# Patient Record
Sex: FEMALE | Employment: OTHER | URBAN - METROPOLITAN AREA
[De-identification: names, ages, dates, MRNs, and addresses within clinical notes are randomized per-mention and may not be internally consistent; named-entity substitution may affect disease eponyms.]

---

## 2017-02-24 ENCOUNTER — RADIANT APPOINTMENT (OUTPATIENT)
Dept: ULTRASOUND IMAGING | Facility: CLINIC | Age: 22
End: 2017-02-24
Attending: OBSTETRICS & GYNECOLOGY
Payer: COMMERCIAL

## 2017-02-24 ENCOUNTER — OFFICE VISIT (OUTPATIENT)
Dept: OBGYN | Facility: CLINIC | Age: 22
End: 2017-02-24
Payer: COMMERCIAL

## 2017-02-24 VITALS
BODY MASS INDEX: 22.5 KG/M2 | HEIGHT: 56 IN | DIASTOLIC BLOOD PRESSURE: 58 MMHG | SYSTOLIC BLOOD PRESSURE: 102 MMHG | WEIGHT: 100 LBS

## 2017-02-24 DIAGNOSIS — Z30.431 IUD CHECK UP: ICD-10-CM

## 2017-02-24 DIAGNOSIS — Z12.4 SCREENING FOR CERVICAL CANCER: ICD-10-CM

## 2017-02-24 DIAGNOSIS — M25.559 PAIN IN JOINT, PELVIC REGION AND THIGH, UNSPECIFIED LATERALITY: Primary | ICD-10-CM

## 2017-02-24 DIAGNOSIS — Z30.011 OCP (ORAL CONTRACEPTIVE PILLS) INITIATION: ICD-10-CM

## 2017-02-24 DIAGNOSIS — Z30.432 ENCOUNTER FOR IUD REMOVAL: ICD-10-CM

## 2017-02-24 DIAGNOSIS — M25.559 PAIN IN JOINT, PELVIC REGION AND THIGH, UNSPECIFIED LATERALITY: ICD-10-CM

## 2017-02-24 DIAGNOSIS — R87.610 ASCUS OF CERVIX WITH NEGATIVE HIGH RISK HPV: ICD-10-CM

## 2017-02-24 PROCEDURE — 88175 CYTOPATH C/V AUTO FLUID REDO: CPT | Performed by: OBSTETRICS & GYNECOLOGY

## 2017-02-24 PROCEDURE — 58301 REMOVE INTRAUTERINE DEVICE: CPT | Performed by: OBSTETRICS & GYNECOLOGY

## 2017-02-24 PROCEDURE — 76830 TRANSVAGINAL US NON-OB: CPT | Performed by: OBSTETRICS & GYNECOLOGY

## 2017-02-24 PROCEDURE — 99213 OFFICE O/P EST LOW 20 MIN: CPT | Mod: 25 | Performed by: OBSTETRICS & GYNECOLOGY

## 2017-02-24 RX ORDER — NORETHINDRONE ACETATE AND ETHINYL ESTRADIOL 1MG-20(21)
1 KIT ORAL DAILY
Qty: 84 TABLET | Refills: 3 | Status: SHIPPED | OUTPATIENT
Start: 2017-02-24 | End: 2017-10-13

## 2017-02-24 NOTE — MR AVS SNAPSHOT
"              After Visit Summary   2/24/2017    Mile De León    MRN: [id number]           Patient Information     Date Of Birth          1995        Visit Information        Provider Department      2/24/2017 1:40 PM Malena Arnold MD [facility]        Today's Diagnoses     Pain in joint, pelvic region and thigh, unspecified laterality    -  1    IUD check up        OCP (oral contraceptive pills) initiation        ASCUS of cervix with negative high risk HPV        Screening for cervical cancer        Encounter for IUD removal           Follow-ups after your visit        Who to contact     If you have questions or need follow up information about today's clinic visit or your schedule please contact [facility] directly at [phone].  Normal or non-critical lab and imaging results will be communicated to you by Perminovahart, letter or phone within 4 business days after the clinic has received the results. If you do not hear from us within 7 days, please contact the clinic through Perminovahart or phone. If you have a critical or abnormal lab result, we will notify you by phone as soon as possible.  Submit refill requests through [facility] or call your pharmacy and they will forward the refill request to us. Please allow 3 business days for your refill to be completed.          Additional Information About Your Visit        Perminovahart Information     [facility] lets you send messages to your doctor, view your test results, renew your prescriptions, schedule appointments and more. To sign up, go to [email or web address] . Click on \"Log in\" on the left side of the screen, which will take you to the Welcome page. Then click on \"Sign up Now\" on the right side of the page.     You will be asked to enter the access code listed below, as well as some personal information. Please follow the directions to create your username and password.     Your access code is: [email or web address]  Expires: " "2017  3:02 PM     Your access code will  in 90 days. If you need help or a new code, please call your [place] clinic or [phone].        Care EveryWhere ID     This is your Care EveryWhere ID. This could be used by other organizations to access your [place] medical records  [id number]        Your Vitals Were     Height Last Period BMI (Body Mass Index)             4' 8\" (1.422 m) 2017 22.42 kg/m2          Blood Pressure from Last 3 Encounters:   17 102/58   06/08/15 120/77   10/23/13 114/54    Weight from Last 3 Encounters:   17 100 lb (45.4 kg)   06/05/15 124 lb (56.2 kg)   05/27/15 120 lb (54.4 kg)              We Performed the Following     Pap imaged thin layer screen reflex to HPV if ASCUS - recommended age 25 - 29 years     REMOVE INTRAUTERINE DEVICE          Today's Medication Changes          These changes are accurate as of: 17  3:02 PM.  If you have any questions, ask your nurse or doctor.               Start taking these medicines.        Dose/Directions    norethindrone-ethinyl estradiol 1-20 MG-MCG per tablet   Commonly known as:  JUNEL FE 1/20   Used for:  OCP (oral contraceptive pills) initiation   Started by:  Malena Arnold MD        Dose:  1 tablet   Take 1 tablet by mouth daily   Quantity:  84 tablet   Refills:  3            Where to get your medicines      These medications were sent to [facility]/pharmacy #[id number] [address] [address]     Phone:  [phone]     norethindrone-ethinyl estradiol 1-20 MG-MCG per tablet                Primary Care Provider    Physician No Ref-Primary       No address on file        Thank you!     Thank you for choosing [facility]  for your care. Our goal is always to provide you with excellent care. Hearing back from our patients is one way we can continue to improve our services. Please take a few minutes to complete the written survey that you " may receive in the mail after your visit with us. Thank you!             Your Updated Medication List - Protect others around you: Learn how to safely use, store and throw away your medicines at www.disposemymeds.org.          This list is accurate as of: 2/24/17  3:02 PM.  Always use your most recent med list.                   Brand Name Dispense Instructions for use    norethindrone-ethinyl estradiol 1-20 MG-MCG per tablet    JUNEL FE 1/20    84 tablet    Take 1 tablet by mouth daily       prenatal multivitamin  plus iron 27-0.8 MG Tabs per tablet      Take 1 tablet by mouth daily       senna-docusate 8.6-50 MG per tablet    SENOKOT-S;PERICOLACE    40 tablet    Take 1 tablet by mouth daily

## 2017-02-24 NOTE — PROGRESS NOTES
SUBJECTIVE:                                                   Mile De León is a 21 year old female who presents to clinic today for the following health issue(s):  Patient presents with:  IUD: IUD removal      Additional information: discuss different options for birth control    HPI:  Patient hasn't been seen since her mirena was place 2015. At that time was her PP and she had an ASCUS pap but HPV negative. Hasn't followed up since..  For the most part patient likes her IUD. Gets a very light period of many 1 or 2 brown spotting days and not even every month. Has not had pain or cramping until last week when called for this appointment. Had a week where had a lot of lower abdominal pain and cramping and would happen just when laying in bed and not doing anything. Tried to be s.a couple times and hurt too much so couldn't even. . That was a week ago and now is just fine.  Only issue is feel like having a lot of hormonal ups and downs and mood swings and just not liking it. Has happened since the IUD but has had that since having Sarina so could be just related to having a child now. Was on ocps for years before that. Did have a break from everything for quite a while before Sarina and no mood swings then. Would like to try to switch to an ocp from her IUD and then will stop it in about a year and try again.    Patient's last menstrual period was 2017..   Patient is sexually active, .  Using IUD for contraception.    reports that she has quit smoking. She does not have any smokeless tobacco history on file.    STD testing offered?  Declined    Health maintenance updated:  yes    Problem list and histories reviewed & adjusted, as indicated.  Additional history: as documented.    Patient Active Problem List   Diagnosis     Indication for care in labor or delivery     Normal labor     Pregnancy     S/P  section     Past Surgical History   Procedure Laterality Date      section N/A  "2015     Procedure:  SECTION;  Surgeon: Malena Arnold MD;  Location:  L+D      Social History   Substance Use Topics     Smoking status: Former Smoker     Smokeless tobacco: Not on file     Alcohol use No           Current Outpatient Prescriptions   Medication Sig     senna-docusate (SENOKOT-S;PERICOLACE) 8.6-50 MG per tablet Take 1 tablet by mouth daily     ferrous sulfate (IRON) 325 (65 FE) MG tablet Take 1 tablet (325 mg) by mouth daily (with breakfast)     Prenatal Vit-Fe Fumarate-FA (PRENATAL MULTIVITAMIN  PLUS IRON) 27-0.8 MG TABS Take 1 tablet by mouth daily     oxyCODONE (ROXICODONE) 5 MG immediate release tablet Take 1-2 tablets (5-10 mg) by mouth every 4 hours as needed for moderate to severe pain (Patient not taking: Reported on 2017)     No current facility-administered medications for this visit.      No Known Allergies    ROS:  Constitutional: Fatigue, Loss of Appetite and Weight Loss  Head: Sore Throat  Gastrointestinal: Abdominal Pain, Bloating and Constipation  Genitourinary: Irregular Menses, Painful [place], Significant PMS and Vaginal Dryness  Endocrine: Decreased Libido and Loss of Hair  Psychiatric: Anxiety and Mittal    OBJECTIVE:     /58  Ht 4' 8\" (1.422 m)  Wt 100 lb (45.4 kg)  LMP 2017  BMI 22.42 kg/m2  Body mass index is 22.42 kg/(m^2).    Exam:  Constitutional:  Appearance: Well nourished, well developed alert, in no acute distress  Pelvic Exam:  External Genitalia:     Normal appearance for age, no discharge present, no tenderness present, no inflammatory lesions present, color normal  Vagina:     Normal vaginal vault without central or paravaginal defects, no discharge present, no inflammatory lesions present, no masses present  Bladder:     Nontender to palpation  Urethra:   Urethral Body:  Urethra palpation normal, urethra structural support normal   Urethral Meatus:  No erythema or lesions present  Cervix:     Appearance healthy, no lesions " present, nontender to palpation, no bleeding present  Uterus:     Nontender to palpation, no masses present, position anteflexed, mobility: normal  Adnexa:     No adnexal tenderness present, no adnexal masses present  Perineum:     Perineum within normal limits, no evidence of trauma, no rashes or skin lesions present  Anus:     Anus within normal limits, no hemorrhoids present  Inguinal Lymph Nodes:     No lymphadenopathy present  Pubic Hair:     Normal pubic hair distribution for age  Genitalia and Groin:     No rashes present, no lesions present, no areas of discoloration, no masses present     In-Clinic Test Results:  No results found for this or any previous visit (from the past 24 hour(s)).    ASSESSMENT/PLAN:                                                        ICD-10-CM    1. Pain in joint, pelvic region and thigh, unspecified laterality M25.559 US Transvaginal Non OB   2. IUD check up Z30.431 US Transvaginal Non OB         U/S was done d/t the pain last week. Everything normal on u/s but does sound like may have had a cyst of some sort.  Patient not sure what ocps she was on before but they were monphasic. Will try junel  and will have her start today but use condoms as a back up for one month  Pap repeated today since last one was ASCUS 18 months ago.    Malena Arnold MD  [facility] [place]  INDICATIONS:                                                      Is a pregnancy test required: No.  Was a consent obtained?  Yes    Mile De León is a 21 year old female,, Patient's last menstrual period was 2017. who presents today for IUD removal. Her current IUD was placed 7/31/15. She has had problems including mood swings and pain for one week last week. with the IUD. She requests removal of the IUD because she wants to change her method of contraception    Today's PHQ-2 Score: No flowsheet data found.    PROCEDURE:                                                      A  speculum exam was performed and the cervix was visualized. The IUD string was not visualized. Using ring forceps, the string  was grasped from just inside the canal and the IUD removed intact.    POST PROCEDURE:                                                      The patient tolerated the procedure well. Patient was discharged in stable condition.    Call if bleeding, pain or fever occur., Birth control counseling given. and Use of condoms/foam discussed.    Malena Arnold MD

## 2017-02-28 LAB
COPATH REPORT: NORMAL
PAP: NORMAL

## 2017-10-12 DIAGNOSIS — O36.80X0 ENCOUNTER TO DETERMINE FETAL VIABILITY OF PREGNANCY, SINGLE OR UNSPECIFIED FETUS: Primary | ICD-10-CM

## 2017-10-13 ENCOUNTER — PRENATAL OFFICE VISIT (OUTPATIENT)
Dept: OBGYN | Facility: CLINIC | Age: 22
End: 2017-10-13
Payer: MEDICAID

## 2017-10-13 ENCOUNTER — RADIANT APPOINTMENT (OUTPATIENT)
Dept: ULTRASOUND IMAGING | Facility: CLINIC | Age: 22
End: 2017-10-13
Payer: MEDICAID

## 2017-10-13 VITALS
SYSTOLIC BLOOD PRESSURE: 108 MMHG | DIASTOLIC BLOOD PRESSURE: 62 MMHG | BODY MASS INDEX: 23.62 KG/M2 | WEIGHT: 105 LBS | HEIGHT: 56 IN

## 2017-10-13 DIAGNOSIS — O36.80X0 ENCOUNTER TO DETERMINE FETAL VIABILITY OF PREGNANCY, SINGLE OR UNSPECIFIED FETUS: ICD-10-CM

## 2017-10-13 DIAGNOSIS — O26.841 UTERINE SIZE-DATE DISCREPANCY IN FIRST TRIMESTER: ICD-10-CM

## 2017-10-13 DIAGNOSIS — O36.80X0 PREGNANCY WITH INCONCLUSIVE FETAL VIABILITY, SINGLE OR UNSPECIFIED FETUS: Primary | ICD-10-CM

## 2017-10-13 PROCEDURE — 99213 OFFICE O/P EST LOW 20 MIN: CPT | Performed by: OBSTETRICS & GYNECOLOGY

## 2017-10-13 PROCEDURE — 76817 TRANSVAGINAL US OBSTETRIC: CPT | Performed by: OBSTETRICS & GYNECOLOGY

## 2017-10-13 RX ORDER — CHLORAL HYDRATE 500 MG
2 CAPSULE ORAL DAILY
COMMUNITY
End: 2018-08-06

## 2017-10-13 NOTE — PATIENT INSTRUCTIONS
US reviewed today and discussed dating discrepancy.  Reviewed possible incorrect dating or late ovualation vs threatened miscarriage.  Given Mile's recent longer cycle (and therefore later ovulation), I feel that this may simply be shifted dating but will return in 2 weeks for repeat viability us and new OB visit if everything looks okay.  Patient is known blood type negative so understands the importance of letting us know if she were to have any bleeding/spotting due to need for rhogam.

## 2017-10-13 NOTE — MR AVS SNAPSHOT
After Visit Summary   10/13/2017    Mile Cobos    MRN: [id number]           Patient Information     Date Of Birth          1995        Visit Information        Provider Department      10/13/2017 8:20 AM Loreta Rodriguez MD; [facility] TRI[facility]        Today's Diagnoses     Pregnancy with inconclusive fetal viability, single or unspecified fetus    -  1    Uterine size-date discrepancy in first trimester          Care Instructions    US reviewed today and discussed dating discrepancy.  Reviewed possible incorrect dating or late ovualation vs threatened miscarriage.  Given Mile's recent longer cycle (and therefore later ovulation), I feel that this may simply be shifted dating but will return in 2 weeks for repeat viability us and new OB visit if everything looks okay.  Patient is known blood type negative so understands the importance of letting us know if she were to have any bleeding/spotting due to need for rhogam.          Follow-ups after your visit        Follow-up notes from your care team     Return in about 2 weeks (around 10/27/2017) for Viability us and New OB visit.      Your next 10 appointments already scheduled     Oct 25, 2017 12:45 PM CDT   US PELVIC COMPLETE W TRANSVAGINAL with [email or web address]   [facility] [place] ([facility] [place])    [address] [place] [id number] [phone]           Please bring a list of your medicines (including vitamins, minerals and over-the-counter drugs). Also, tell your doctor about any allergies you may have. Wear comfortable clothes and leave your valuables at home.  Adults: Drink six 8-ounce glasses of fluid one hour before your exam. Do NOT empty your bladder.  If you need to empty your bladder before your exam, try to release only a little bit of urine. Then, drink another 8oz glass of fluid.  Children: Children who are potty trained should drink at least 4 cups (32 oz)  "of liquid 45 minutes to one hour prior to the exam. The child s bladder must be full in order to achieve a diagnostic exam. If your child is very uncomfortable or has an urgent need to pee, please notify a technologist; they will try to find out how much longer the wait may be and provide instructions to help relieve the pressure. Occasionally it is medically necessary to insert a urinary catheter to fill the bladder.  Please call the Imaging Department at your exam site with any questions.            Oct 25, 2017  1:20 PM CDT   New Prenatal with Loreta Rodriguez MD, WE TRIAGE   [facility] [place] [place] [place]    [address] [place] [id number] [phone]              Who to contact     If you have questions or need follow up information about today's clinic visit or your schedule please contact [facility] directly at [phone].  Normal or non-critical lab and imaging results will be communicated to you by BYTEGRIDhart, letter or phone within 4 business days after the clinic has received the results. If you do not hear from us within 7 days, please contact the clinic through BYTEGRIDhart or phone. If you have a critical or abnormal lab result, we will notify you by phone as soon as possible.  Submit refill requests through [employer] or call your pharmacy and they will forward the refill request to us. Please allow 3 business days for your refill to be completed.          Additional Information About Your Visit        Map [employer]isions Information     [employer] lets you send messages to your doctor, view your test results, renew your prescriptions, schedule appointments and more. To sign up, go to [email or web address] . Click on \"Log in\" on the left side of the screen, which will take you to the Welcome page. Then click on \"Sign up Now\" on the right side of the page.     You will be asked to enter the access code listed below, as well as some " "personal information. Please follow the directions to create your username and password.     Your access code is: [email or web address]  Expires: 2018  9:02 AM     Your access code will  in 90 days. If you need help or a new code, please call your [place] clinic or [phone].        Care EveryWhere ID     This is your Care EveryWhere ID. This could be used by other organizations to access your [place] medical records  [id number]        Your Vitals Were     Height Last Period Breastfeeding? BMI (Body Mass Index)          4' 8\" (1.422 m) 2017 (Exact Date) No 23.54 kg/m2         Blood Pressure from Last 3 Encounters:   10/13/17 108/62   17 102/58   06/08/15 120/77    Weight from Last 3 Encounters:   10/13/17 105 lb (47.6 kg)   17 100 lb (45.4 kg)   06/05/15 124 lb (56.2 kg)              Today, you had the following     No orders found for display       Primary Care Provider    Physician No Ref-Primary       NO REF-PRIMARY PHYSICIAN        Equal Access to Services     [facility]: Hadii estelle padilla hadkevan Guerrier, waaxda bernabe, qaybta kaaldeonte mosquera, ralph bravo . So [facility] [phone].    ATENCIÓN: Si habla español, tiene a monreal disposición servicios gratuitos de asistencia lingüística. Alonsoame al [phone].    We comply with applicable federal civil rights laws and [place] laws. We do not discriminate on the basis of race, color, national origin, age, disability, sex, sexual orientation, or gender identity.            Thank you!     Thank you for choosing [facility]  for your care. Our goal is always to provide you with excellent care. Hearing back from our patients is one way we can continue to improve our services. Please take a few minutes to complete the written survey that you may receive in the mail after your visit with us. Thank you!             Your Updated Medication List - Protect others around you: Learn how to safely use, " store and throw away your medicines at www.disposemymeds.org.          This list is accurate as of: 10/13/17  9:09 AM.  Always use your most recent med list.                   Brand Name Dispense Instructions for use Diagnosis    fish oil-omega-3 fatty acids 1000 MG capsule      Take 2 g by mouth daily        IRON SUPPLEMENT PO           prenatal multivitamin plus iron 27-0.8 MG Tabs per tablet      Take 1 tablet by mouth daily

## 2017-10-13 NOTE — PROGRESS NOTES
SUBJECTIVE:                                                   Mile Cobos is a 22 year old female who presents to clinic today for the following health issue(s):  Patient presents with:  Prenatal Care: Newly pregnant, U/S today         HPI:  Here today for new OB visit but due to discrepancy in dating, will repeat us and new OB visit if appropriate progress.  Had Mirena removed in Feb and had 30-32d cycles until August.  August cycle was 42d which is more similar to her menses prior to birth control.  Had a positive home UPT 2 weeks ago and started feeling morning sickness/nausea shortly thereafter.  No vb/spotting/cramping.    US shows CRL c/w 6+0wks and fht 115bpm; small bj    Patient's last menstrual period was 2017 (exact date)..   Patient is sexually active, .  Using none for contraception.    reports that she has quit smoking. She has never used smokeless tobacco.      STD testing offered?  Declined    Health maintenance updated:  yes    Today's PHQ-2 Score: No flowsheet data found.  Today's PHQ-9 Score: No flowsheet data found.  Today's SANIA-7 Score: No flowsheet data found.    Problem list and histories reviewed & adjusted, as indicated.  Additional history: as documented.    Patient Active Problem List   Diagnosis     S/P  section     Past Surgical History:   Procedure Laterality Date      SECTION N/A 2015    Procedure:  SECTION;  Surgeon: Malena Arnold MD;  Location:  L+D      Social History   Substance Use Topics     Smoking status: Former Smoker     Smokeless tobacco: Never Used     Alcohol use No      *Patient is Adopted       Problem (# of Occurrences) Relation (Name,Age of Onset)    HIV/AIDS (1) Mother: biological mom. contracted it while in senior living when Mile was a young child.  of AIDS. doesn't know her biological father.            Current Outpatient Prescriptions   Medication Sig     fish oil-omega-3 fatty acids 1000 MG capsule Take 2 g by  "mouth daily     Ferrous Sulfate (IRON SUPPLEMENT PO)      Prenatal Vit-Fe Fumarate-FA (PRENATAL MULTIVITAMIN  PLUS IRON) 27-0.8 MG TABS Take 1 tablet by mouth daily     No current facility-administered medications for this visit.      No Known Allergies    ROS:  Constitutional: Fatigue, Loss of Appetite, Weight Gain and Weight Loss  Head: Nasal Congestion  Breast: Tenderness  Cardiovascular: Lightheadedness and Palpitations  Respiratory: Shortness of Breath  Gastrointestinal: Abdominal Pain, Bloating, Constipation, Diarrhea, Nausea and Vomiting  Genitourinary: Cramps  Endocrine: Excessive Thirst and Loss of Hair  Psychiatric: Difficulty Sleeping and Mittal    OBJECTIVE:     /62  Ht 4' 8\" (1.422 m)  Wt 105 lb (47.6 kg)  LMP 08/13/2017 (Exact Date)  Breastfeeding? No  BMI 23.54 kg/m2  Body mass index is 23.54 kg/(m^2).    Exam:  Constitutional:  Appearance: Well nourished, well developed alert, in no acute distress  Neurologic/Psychiatric:  Mental Status:  Oriented X3      In-Clinic Test Results:  Results for orders placed or performed in visit on 10/13/17 (from the past 24 hour(s))   US OB Transvaginal Only    Narrative    Obstetrical Ultrasound Report  OB 1st trimester U/S -  Transvaginal  [facility] [place]  Referring Provider: Dr. Loreta Rodriguez  Sonographer: Greta Martinez RDMS RVT  Indication:  Viability check     Dating (mm/dd/yyyy):   LMP: 08/13/17            EDC:  05/20/18            GA by LMP:                     8w5d  Current Scan On:  10/13/17                       EDC:  06/08/18            GA by Current   Scan:                  6w0d  The calculation of the gestational age by current scan was based on CRL.  Anatomy Scan:  Lofton gestation.  Biometry:  CRL                                           3.5mm                                       6w0d                                                                     Yolk Sac                         3.0mm                                  "                                                             Fetal heart rate: 115bpm  Findings: viable IUP, bj = 1.3x 1.1x 0.7cm     Maternal Structures:  Cervix: The cervix appears long and closed.  Right Adnexa: normal  Left Adnexa: normal  Impression:   [place] IUP with growth at 6w 0d (+/- 7-10 days) which is   not consistent with menstrual dating, EDC 6/8/2018 by this ultrasound.    Recommend clinical correlation and SAURABH adjustment if indicated.  Will   repeat the ultrasound in 2 weeks given the dating discrepancy.    Loreta Rodriguez MD         ASSESSMENT/PLAN:                                                        ICD-10-CM    1. Pregnancy with inconclusive fetal viability, single or unspecified fetus O36.80X0    2. Uterine size-date discrepancy in first trimester O26.841        Patient Instructions   US reviewed today and discussed dating discrepancy.  Reviewed possible incorrect dating or late ovualation vs threatened miscarriage.  Given Mile's recent longer cycle (and therefore later ovulation), I feel that this may simply be shifted dating but will return in 2 weeks for repeat viability us and new OB visit if everything looks okay.  Patient is known blood type negative so understands the importance of letting us know if she were to have any bleeding/spotting due to need for rhogam.      15min spent with patient reviewing us and formulating plan; all questions answered    Loreta Rodriguez MD  [facility] [place]

## 2017-10-24 DIAGNOSIS — O36.80X0 ENCOUNTER TO DETERMINE FETAL VIABILITY OF PREGNANCY, SINGLE OR UNSPECIFIED FETUS: Primary | ICD-10-CM

## 2017-10-25 ENCOUNTER — RADIANT APPOINTMENT (OUTPATIENT)
Dept: ULTRASOUND IMAGING | Facility: CLINIC | Age: 22
End: 2017-10-25
Payer: MEDICAID

## 2017-10-25 ENCOUNTER — PRENATAL OFFICE VISIT (OUTPATIENT)
Dept: OBGYN | Facility: CLINIC | Age: 22
End: 2017-10-25
Payer: MEDICAID

## 2017-10-25 VITALS
SYSTOLIC BLOOD PRESSURE: 105 MMHG | WEIGHT: 106 LBS | HEIGHT: 58 IN | HEART RATE: 67 BPM | DIASTOLIC BLOOD PRESSURE: 55 MMHG | BODY MASS INDEX: 22.25 KG/M2

## 2017-10-25 DIAGNOSIS — O34.219 PREVIOUS CESAREAN DELIVERY, ANTEPARTUM: ICD-10-CM

## 2017-10-25 DIAGNOSIS — O36.80X0 ENCOUNTER TO DETERMINE FETAL VIABILITY OF PREGNANCY, SINGLE OR UNSPECIFIED FETUS: ICD-10-CM

## 2017-10-25 DIAGNOSIS — Z23 NEED FOR PROPHYLACTIC VACCINATION AND INOCULATION AGAINST INFLUENZA: ICD-10-CM

## 2017-10-25 DIAGNOSIS — O09.91 SUPERVISION OF HIGH RISK PREGNANCY IN FIRST TRIMESTER: Primary | ICD-10-CM

## 2017-10-25 LAB
ABO + RH BLD: NORMAL
ABO + RH BLD: NORMAL
ALBUMIN UR-MCNC: NEGATIVE MG/DL
APPEARANCE UR: CLEAR
BACTERIA #/AREA URNS HPF: ABNORMAL /HPF
BASOPHILS # BLD AUTO: 0 10E9/L (ref 0–0.2)
BASOPHILS NFR BLD AUTO: 0.1 %
BILIRUB UR QL STRIP: NEGATIVE
BLD GP AB SCN SERPL QL: NORMAL
BLOOD BANK CMNT PATIENT-IMP: NORMAL
COLOR UR AUTO: YELLOW
DIFFERENTIAL METHOD BLD: ABNORMAL
EOSINOPHIL # BLD AUTO: 0 10E9/L (ref 0–0.7)
EOSINOPHIL NFR BLD AUTO: 0.1 %
ERYTHROCYTE [DISTWIDTH] IN BLOOD BY AUTOMATED COUNT: 11.8 % (ref 10–15)
GLUCOSE UR STRIP-MCNC: NEGATIVE MG/DL
HCT VFR BLD AUTO: 38.2 % (ref 35–47)
HGB BLD-MCNC: 14.1 G/DL (ref 11.7–15.7)
HGB UR QL STRIP: NEGATIVE
KETONES UR STRIP-MCNC: NEGATIVE MG/DL
LEUKOCYTE ESTERASE UR QL STRIP: NEGATIVE
LYMPHOCYTES # BLD AUTO: 2.3 10E9/L (ref 0.8–5.3)
LYMPHOCYTES NFR BLD AUTO: 27.3 %
MCH RBC QN AUTO: 31.1 PG (ref 26.5–33)
MCHC RBC AUTO-ENTMCNC: 36.9 G/DL (ref 31.5–36.5)
MCV RBC AUTO: 84 FL (ref 78–100)
MONOCYTES # BLD AUTO: 0.7 10E9/L (ref 0–1.3)
MONOCYTES NFR BLD AUTO: 7.9 %
NEUTROPHILS # BLD AUTO: 5.5 10E9/L (ref 1.6–8.3)
NEUTROPHILS NFR BLD AUTO: 64.6 %
NITRATE UR QL: NEGATIVE
PH UR STRIP: 7 PH (ref 5–7)
PLATELET # BLD AUTO: 230 10E9/L (ref 150–450)
RBC # BLD AUTO: 4.54 10E12/L (ref 3.8–5.2)
RBC #/AREA URNS AUTO: ABNORMAL /HPF
SOURCE: ABNORMAL
SP GR UR STRIP: 1.02 (ref 1–1.03)
SPECIMEN EXP DATE BLD: NORMAL
UROBILINOGEN UR STRIP-ACNC: 0.2 EU/DL (ref 0.2–1)
WBC # BLD AUTO: 8.5 10E9/L (ref 4–11)
WBC #/AREA URNS AUTO: ABNORMAL /HPF

## 2017-10-25 PROCEDURE — 87389 HIV-1 AG W/HIV-1&-2 AB AG IA: CPT | Performed by: OBSTETRICS & GYNECOLOGY

## 2017-10-25 PROCEDURE — 86780 TREPONEMA PALLIDUM: CPT | Performed by: OBSTETRICS & GYNECOLOGY

## 2017-10-25 PROCEDURE — 90471 IMMUNIZATION ADMIN: CPT | Performed by: OBSTETRICS & GYNECOLOGY

## 2017-10-25 PROCEDURE — 99214 OFFICE O/P EST MOD 30 MIN: CPT | Mod: 25 | Performed by: OBSTETRICS & GYNECOLOGY

## 2017-10-25 PROCEDURE — 85025 COMPLETE CBC W/AUTO DIFF WBC: CPT | Performed by: OBSTETRICS & GYNECOLOGY

## 2017-10-25 PROCEDURE — 86900 BLOOD TYPING SEROLOGIC ABO: CPT | Performed by: OBSTETRICS & GYNECOLOGY

## 2017-10-25 PROCEDURE — G0499 HEPB SCREEN HIGH RISK INDIV: HCPCS | Performed by: OBSTETRICS & GYNECOLOGY

## 2017-10-25 PROCEDURE — 81001 URINALYSIS AUTO W/SCOPE: CPT | Performed by: OBSTETRICS & GYNECOLOGY

## 2017-10-25 PROCEDURE — 90686 IIV4 VACC NO PRSV 0.5 ML IM: CPT | Performed by: OBSTETRICS & GYNECOLOGY

## 2017-10-25 PROCEDURE — 86850 RBC ANTIBODY SCREEN: CPT | Performed by: OBSTETRICS & GYNECOLOGY

## 2017-10-25 PROCEDURE — 87086 URINE CULTURE/COLONY COUNT: CPT | Performed by: OBSTETRICS & GYNECOLOGY

## 2017-10-25 PROCEDURE — 86762 RUBELLA ANTIBODY: CPT | Performed by: OBSTETRICS & GYNECOLOGY

## 2017-10-25 PROCEDURE — 36415 COLL VENOUS BLD VENIPUNCTURE: CPT | Performed by: OBSTETRICS & GYNECOLOGY

## 2017-10-25 PROCEDURE — 86901 BLOOD TYPING SEROLOGIC RH(D): CPT | Performed by: OBSTETRICS & GYNECOLOGY

## 2017-10-25 PROCEDURE — 76817 TRANSVAGINAL US OBSTETRIC: CPT | Performed by: OBSTETRICS & GYNECOLOGY

## 2017-10-25 ASSESSMENT — ANXIETY QUESTIONNAIRES
7. FEELING AFRAID AS IF SOMETHING AWFUL MIGHT HAPPEN: NOT AT ALL
1. FEELING NERVOUS, ANXIOUS, OR ON EDGE: NOT AT ALL
2. NOT BEING ABLE TO STOP OR CONTROL WORRYING: NOT AT ALL
6. BECOMING EASILY ANNOYED OR IRRITABLE: SEVERAL DAYS
GAD7 TOTAL SCORE: 1
3. WORRYING TOO MUCH ABOUT DIFFERENT THINGS: NOT AT ALL
IF YOU CHECKED OFF ANY PROBLEMS ON THIS QUESTIONNAIRE, HOW DIFFICULT HAVE THESE PROBLEMS MADE IT FOR YOU TO DO YOUR WORK, TAKE CARE OF THINGS AT HOME, OR GET ALONG WITH OTHER PEOPLE: NOT DIFFICULT AT ALL
5. BEING SO RESTLESS THAT IT IS HARD TO SIT STILL: NOT AT ALL

## 2017-10-25 ASSESSMENT — PATIENT HEALTH QUESTIONNAIRE - PHQ9
5. POOR APPETITE OR OVEREATING: NOT AT ALL
SUM OF ALL RESPONSES TO PHQ QUESTIONS 1-9: 7

## 2017-10-25 NOTE — PROGRESS NOTES

## 2017-10-25 NOTE — MR AVS SNAPSHOT
After Visit Summary   10/25/2017    Mile Cobos    MRN: [id number]           Patient Information     Date Of Birth          1995        Visit Information        Provider Department      10/25/2017 1:20 PM Loreta Rodriguez MD; [facility]        Today's Diagnoses     Supervision of high risk pregnancy in first trimester    -  1    Need for prophylactic vaccination and inoculation against influenza        Previous  delivery, antepartum          Care Instructions    Avoid alcoholic beverages.  Discussed previous  section delivery.   risks and benefits discussed.  Discussed risk of rupture in detail.  Leaning towards repeat  section but will decide and discuss further.  Discussed all genetic testing options.  Patient interested in innatal screening.  Flu shot today          Follow-ups after your visit        Follow-up notes from your care team     Return in about 4 weeks (around 2017) for Routine Prenatal Visit.      Your next 10 appointments already scheduled     2017  2:10 PM CST   ESTABLISHED PRENATAL with Loreta Rodriguez MD   [facility] ([facility])    [address] [place] [id number] [phone]              Who to contact     If you have questions or need follow up information about today's clinic visit or your schedule please contact [facility] directly at [phone].  Normal or non-critical lab and imaging results will be communicated to you by MyChart, letter or phone within 4 business days after the clinic has received the results. If you do not hear from us within 7 days, please contact the clinic through MyChart or phone. If you have a critical or abnormal lab result, we will notify you by phone as soon as possible.  Submit refill requests through avolution or call your pharmacy and they will forward the refill request  "to us. Please allow 3 business days for your refill to be completed.          Additional Information About Your Visit        The NewsMarkethart Information     Awareness Card lets you send messages to your doctor, view your test results, renew your prescriptions, schedule appointments and more. To sign up, go to [email or web address] . Click on \"Log in\" on the left side of the screen, which will take you to the Welcome page. Then click on \"Sign up Now\" on the right side of the page.     You will be asked to enter the access code listed below, as well as some personal information. Please follow the directions to create your username and password.     Your access code is: [email or web address]  Expires: 2018  9:02 AM     Your access code will  in 90 days. If you need help or a new code, please call your [place] clinic or [phone].        Care EveryWhere ID     This is your [facility] EveryWhere ID. This could be used by other organizations to access your [facility] medical records  [id number]        Your Vitals Were     Pulse Height Last Period Breastfeeding? BMI (Body Mass Index)       67 4' 10\" (1.473 m) 2017 (Exact Date) No 22.15 kg/m2        Blood Pressure from Last 3 Encounters:   10/25/17 105/55   10/13/17 108/62   17 102/58    Weight from Last 3 Encounters:   10/25/17 106 lb (48.1 kg)   10/13/17 105 lb (47.6 kg)   17 100 lb (45.4 kg)              We Performed the Following     ABO/Rh type and screen     Anti Treponema     CBC with platelets differential     FLU VAC, SPLIT VIRUS IM > 3 YO (QUADRIVALENT) [[id number]]     Hepatitis B surface antigen     HIV Antigen Antibody Combo     Rubella Antibody IgG Quantitative     UA with Microscopic     Urine Culture Aerobic Bacterial     Vaccine Administration, Initial [[id number]]        Primary Care Provider    Physician No Ref-Primary       NO REF-PRIMARY PHYSICIAN        Equal Access to Services     HEMANT PATEL AH: Hadii estelle Guerrier, mattie kidd, qaybta " ralph jaramillodionne bravo ah. So [facility] [phone].    ATENCIÓN: Si carla santos, tiene a monreal disposición servicios gratuitos de asistencia lingüística. Llame al [phone].    We comply with applicable federal civil rights laws and [place] laws. We do not discriminate on the basis of race, color, national origin, age, disability, sex, sexual orientation, or gender identity.            Thank you!     Thank you for choosing [facility]  for your care. Our goal is always to provide you with excellent care. Hearing back from our patients is one way we can continue to improve our services. Please take a few minutes to complete the written survey that you may receive in the mail after your visit with us. Thank you!             Your Updated Medication List - Protect others around you: Learn how to safely use, store and throw away your medicines at www.disposemymeds.org.          This list is accurate as of: 10/25/17  2:42 PM.  Always use your most recent med list.                   Brand Name Dispense Instructions for use Diagnosis    fish oil-omega-3 fatty acids 1000 MG capsule      Take 2 g by mouth daily        IRON SUPPLEMENT PO      Take 27 mg by mouth        prenatal multivitamin plus iron 27-0.8 MG Tabs per tablet      Take 2 tablets by mouth daily

## 2017-10-25 NOTE — NURSING NOTE
[facility] Obstetrical Risk History    Patient presents for new OB labs and teaching.      1. Please indicate any condition you have or have had in the past:  Abnormal Pap, Chlamydia   2. Do you smoke?  No  If yes, how many packs/day?   3. Do you drink alcoholic beverages? No  If yes, how often?  What type?   4. List any medications taken since your last period: None  5. List any recreational drugs (cocaine, marijuana, etc. used since your last period:None    6. List any chemical or radiation exposure that you've encountered: None  7. Are you on a restricted diet? No  If yes, please describe:  8.   Do you have any Mosque objections to any form of treatment? No    GENETIC SCREENING    These questions apply to you, the baby's father, or anyone in either family with:    1. Patient's age 35 or greater at delivery? No  2. Upper sorbian, Malay, Mediterranean ancestry? No  3. Neural Tube Defect (Meningomyelocele, Spina Bifida, or Anencephaly)? No  4. Temple, Bruneian Sampson or history of Doyle-Sachs disease? No  5. Down's Syndrome?   No  6. Hemophilia or clotting disorder? No  7. Muscular Dystrophy? No  8. Cystic Fibrosis? No  9. Liliane's Chorea? No  10. Mental Retardation? No  11. 3 or more miscarriages or a stillborn? No  12. Other inherited disease or chromosomal disorder? No  13. Have you or the baby's father had a child born with a birth defect? No  14. Did you or the baby's father have a birth defect yourselves? No    Do you have any other concerns about birth defects? No      -Second pregnancy, second baby.  -Pt has not received a flu vaccine this season. Pt is going to receive today at clinic.  -Informed pt about genetic testing/genetic screening. Gave brochure for Innatal and Financial Aid Services through [employer]. Pt had Verifi (different name for Innatal but same test) done with her last pregnancy. Pt is aware minimum to get Innatal done is at 10 wks.  -Up to date with PAP smear.

## 2017-10-25 NOTE — PATIENT INSTRUCTIONS
Avoid alcoholic beverages.  Discussed previous  section delivery.   risks and benefits discussed.  Discussed risk of rupture in detail.  Leaning towards repeat  section but will decide and discuss further.  Discussed all genetic testing options.  Patient interested in innatal screening.  Flu shot today

## 2017-10-25 NOTE — PROGRESS NOTES
This is a 22 year old female patient,   who presents for her first obstetrical visit.    Patient's last menstrual period was 2017 (exact date). This gives her an EDC of 2018 .  She is 8w1d weeks based off ultrasound on 10/25/2017.  Her cycles are irregular 32-42 days.  Her last menstrual period was normal. Since her LMP, she has experienced  fatigue, loss of appetite, weight loss, weight gain, breast tenderness, lightheadedness, abdominal pain, bloating, constipation, diarrhea, nausea, vomiting, skin dryness, decreased libido, excessive thirst and cummins).  She denies headache, vaginal discharge, dysuria, pelvic pain, urinary urgency, urinary frequency, vaginal bleeding and hemorrhoids.    New Ob visit --2nd pregnancy.  Sure LMP but had prolonged last 2-3 cycles and dating by ultrasound more consistent with likely later ovulation.  US today shows viable IUP at 8+1wks c/w EDC of 18.  Starting to feel better --has been struggling with nausea and vomiting.  The last couple of days have been better --no emesis yesterday and only dry heaving this AM.  Not taking any prescription or OTC meds for nausea.  Did use zofran with first pregnancy but had horrible constipation.  +constipation without meds today.  No bladder issues.  No vb/spotting/cramping  1st pregnancy --delivered by c/s after 2.5hrs of pushing for presumed chorio and likely CPD with delivery of healthy 6#9oz girl; had hemorrhage with 1200mL EBL and right uterine artery laceration  Otherwise healthy  -agrees to flu shot today    Past History:  Her past medical history   Past Medical History:   Diagnosis Date     Abnormal Pap smear of cervix 2015    AS-CUS, HPV Negative     Anxiety     Has never been on medications for it, stated it started postpartum.     History of chlamydia     In high school, was treated and had a treatment of cure.     History of gonorrhea     In high school, was treated and had a treatment of cure.     IUD  "contraception 2015    Mirena removed 17 by Dr. Aronld.   .      She has a history of  prior  section.    Since her last LMP she denies use of alcohol, tobacco and street drugs.    HISTORY:  Obstetric History       T1      L1     SAB0   TAB0   Ectopic0   Multiple0   Live Births1       # Outcome Date GA Lbr Don/2nd Weight Sex Delivery Anes PTL Lv   2 Current            1 Term 06/05/15 40w4d 14:30 / 03:31 6 lb 9.5 oz (2.991 kg) F CS-LTranv EPI,Local  RIZWANA      Name: Adrianna \"Sarina\"      Apgar1:  8                Apgar5: 9        Past Medical History:   Diagnosis Date     Abnormal Pap smear of cervix 2015    AS-CUS, HPV Negative     Anxiety     Has never been on medications for it, stated it started postpartum.     History of chlamydia     In high school, was treated and had a treatment of cure.     History of gonorrhea     In high school, was treated and had a treatment of cure.     IUD contraception 2015    Mirena removed 17 by Dr. Arnold.     Past Surgical History:   Procedure Laterality Date      SECTION N/A 2015    Procedure:  SECTION;  Surgeon: Malena Arnold MD;  Location:  L+D     Family History   Problem Relation Age of Onset     Adopted: Yes     HIV/AIDS Mother      biological mom. contracted it while in skilled nursing when Mile was a young child.  of AIDS. doesn't know her biological father.     Social History     Social History     Marital status:      Spouse name: N/A     Number of children: 1     Years of education: N/A     Occupational History      [employer]     Social History Main Topics     Smoking status: Former Smoker     Types: Cigarettes     Smokeless tobacco: Never Used      Comment: Stopped smoking when found out is pregnanat with first child.     Alcohol use No     Drug use: No     Sexual activity: Yes     Partners: Male     Birth control/ protection: None     Other Topics Concern     None     Social " "History Narrative     Current Outpatient Prescriptions   Medication Sig     fish oil-omega-3 fatty acids 1000 MG capsule Take 2 g by mouth daily     Ferrous Sulfate (IRON SUPPLEMENT PO) Take 27 mg by mouth      Prenatal Vit-Fe Fumarate-FA (PRENATAL MULTIVITAMIN  PLUS IRON) 27-0.8 MG TABS Take 2 tablets by mouth daily      No current facility-administered medications for this visit.      No Known Allergies    Past medical, surgical, social and family history were reviewed and updated in EPIC.    ROS:   12 point review of systems negative other than symptoms noted below.  Constitutional: Fatigue, Loss of Appetite, Weight Gain and Weight Loss  Breast: Tenderness  Cardiovascular: Lightheadedness  Gastrointestinal: Abdominal Pain, Bloating, Constipation, Diarrhea, Nausea and Vomiting  Skin: Skin Dryness  Endocrine: Decreased Libido and Excessive Thirst  Psychiatric: Mittal    EXAM:  /55 (BP Location: Left arm, Patient Position: Chair, Cuff Size: Adult Regular)  Pulse 67  Ht 4' 10\" (1.473 m)  Wt 106 lb (48.1 kg)  LMP 08/13/2017 (Exact Date)  Breastfeeding? No  BMI 22.15 kg/m2   BMI: Body mass index is 22.15 kg/(m^2).    EXAM:  Constitutional:  Appearance: Well nourished, well developed alert, in no acute distress.  Neck:   Lymph Nodes:  No lymphadenopathy present.    Thyroid:  Gland size normal, nontender, no nodules or masses present  on palpation.  Chest:  Respiratory Effort:  Breathing unlabored.  Cardiovascular:  Heart Auscultation:  Regular rate, normal rhythm, no murmurs    present.  Breasts: Deferred.    Axillary Lymph Nodes:  No lymphadenopathy present.  Gastrointestinal:  Abdominal Examination:  Abdomen nontender to palpation, tone  normal without rigidity or guarding, no masses present, umbilicus without  Lesions.    Liver and speen:  No hepatomegaly present, liver nontender to  palpation.    Hernias:  No hernias present.  Lymphatic: Lymph Nodes:  No other lymphadenopathy present.  Skin:  General " Inspection:  No rashes present, no lesions present, no areas of  discoloration.    Genitalia and Groin:  No rashes present, no lesions present, no areas of  discoloration, no masses present.  Neurologic/Psychiatric:    Mental Status:  Oriented X3.    No Pelvic Exam performed    ASSESSMENT:      ICD-10-CM    1. Supervision of high risk pregnancy in first trimester O09.91 Urine Culture Aerobic Bacterial     UA with Microscopic     ABO/Rh type and screen     Anti Treponema     CBC with platelets differential     Hepatitis B surface antigen     HIV Antigen Antibody Combo     Rubella Antibody IgG Quantitative   2. Need for prophylactic vaccination and inoculation against influenza Z23 FLU VAC, SPLIT VIRUS IM > 3 YO (QUADRIVALENT) [32612]     Vaccine Administration, Initial [35375]   3. Previous  delivery, antepartum O34.219        PLAN/PATIENT INSTRUCTIONS:    Patient Instructions   Avoid alcoholic beverages.  Discussed previous  section delivery.   risks and benefits discussed.  Discussed risk of rupture in detail.  Leaning towards repeat  section but will decide and discuss further.  Discussed all genetic testing options.  Patient interested in innatal screening.  Flu shot today      Loreta Rodriguez MD

## 2017-10-26 LAB
BACTERIA SPEC CULT: NO GROWTH
HBV SURFACE AG SERPL QL IA: NONREACTIVE
HIV 1+2 AB+HIV1 P24 AG SERPL QL IA: NONREACTIVE
Lab: NORMAL
RUBV IGG SERPL IA-ACNC: 18 IU/ML
SPECIMEN SOURCE: NORMAL
T PALLIDUM IGG+IGM SER QL: NEGATIVE

## 2017-10-26 ASSESSMENT — ANXIETY QUESTIONNAIRES: GAD7 TOTAL SCORE: 1

## 2017-10-27 NOTE — PROGRESS NOTES
Please inform of normal results --all new OB labs normal; blood type B negative --as we discussed at her visit, she will need rhogam at 28wks and possibly after delivery depending on baby's blood type

## 2017-11-21 ENCOUNTER — PRENATAL OFFICE VISIT (OUTPATIENT)
Dept: OBGYN | Facility: CLINIC | Age: 22
End: 2017-11-21
Payer: COMMERCIAL

## 2017-11-21 VITALS — DIASTOLIC BLOOD PRESSURE: 60 MMHG | BODY MASS INDEX: 21.19 KG/M2 | WEIGHT: 101.4 LBS | SYSTOLIC BLOOD PRESSURE: 110 MMHG

## 2017-11-21 DIAGNOSIS — O09.91 SUPERVISION OF HIGH RISK PREGNANCY IN FIRST TRIMESTER: Primary | ICD-10-CM

## 2017-11-21 DIAGNOSIS — Z36.1 NEED FOR MATERNAL SERUM ALPHA-PROTEIN (MSAFP) SCREENING: ICD-10-CM

## 2017-11-21 DIAGNOSIS — O34.219 PREVIOUS CESAREAN DELIVERY, ANTEPARTUM: ICD-10-CM

## 2017-11-21 DIAGNOSIS — Z3A.12 12 WEEKS GESTATION OF PREGNANCY: ICD-10-CM

## 2017-11-21 PROCEDURE — 36415 COLL VENOUS BLD VENIPUNCTURE: CPT | Performed by: OBSTETRICS & GYNECOLOGY

## 2017-11-21 PROCEDURE — 82105 ALPHA-FETOPROTEIN SERUM: CPT | Mod: 90 | Performed by: OBSTETRICS & GYNECOLOGY

## 2017-11-21 PROCEDURE — 99207 ZZC PRENATAL VISIT: CPT | Performed by: OBSTETRICS & GYNECOLOGY

## 2017-11-21 PROCEDURE — 99000 SPECIMEN HANDLING OFFICE-LAB: CPT | Performed by: OBSTETRICS & GYNECOLOGY

## 2017-11-21 NOTE — PROGRESS NOTES
Doing well today --nausea nearly completely resolved; occ food aversions/smell sensitivities  NO vb/spotting; occ sharp pains with activity --short lived and self resolving  FHT by bedside us  Innatal drawn today; will check AFP at next visit  RTC 4wks

## 2017-11-21 NOTE — MR AVS SNAPSHOT
"              After Visit Summary   2017    Mile Cobos    MRN: [id number]           Patient Information     Date Of Birth          1995        Visit Information        Provider Department      2017 2:10 PM Loreta Rodriguez MD [facility]        Today's Diagnoses     Supervision of high risk pregnancy in first trimester    -  1    Need for maternal serum alpha-protein (MSAFP) screening        12 weeks gestation of pregnancy        Previous  delivery, antepartum           Follow-ups after your visit        Follow-up notes from your care team     Return in about 4 weeks (around 2017) for Routine Prenatal Visit.      Who to contact     If you have questions or need follow up information about today's clinic visit or your schedule please [facility] at [phone].  Normal or non-critical lab and imaging results will be communicated to you by MyChart, letter or phone within 4 business days after the clinic has received the results. If you do not hear from us within 7 days, please contact the clinic through MyChart or phone. If you have a critical or abnormal lab result, we will notify you by phone as soon as possible.  Submit refill requests through fitogram or call your pharmacy and they will forward the refill request to us. Please allow 3 business days for your refill to be completed.          Additional Information About Your Visit        MyChart Information     fitogram lets you send messages to your doctor, view your test results, renew your prescriptions, schedule appointments and more. To sign up, go to [email or web address] . Click on \"Log in\" on the left side of the screen, which will take you to the Welcome page. Then click on \"Sign up Now\" on the right side of the page.     You will be asked to enter the access code listed below, as well as some personal information. Please follow the directions to create your " username and password.     Your access code is: [email or web address]  Expires: 2018  8:02 AM     Your access code will  in 90 days. If you need help or a new code, please call your [place] clinic or [phone].        Care EveryWhere ID     This is your Care EveryWhere ID. This could be used by other organizations to access your [place] medical records  [id number]        Your Vitals Were     Last Period BMI (Body Mass Index)                2017 (Exact Date) 21.19 kg/m2           Blood Pressure from Last 3 Encounters:   17 110/60   10/25/17 105/55   10/13/17 108/62    Weight from Last 3 Encounters:   17 101 lb 6.4 oz (46 kg)   10/25/17 106 lb (48.1 kg)   10/13/17 105 lb (47.6 kg)              We Performed the Following     Alpha fetoprotein maternal screen        Primary Care Provider    Physician No Ref-Primary       NO REF-PRIMARY PHYSICIAN        Equal Access to Services     [facility] : Hadii aad ku hadasho Soomaali, waaxda luqadaha, qaybta kaalmada adeegyada, waxay nishain hayvalentín bravo . So [facility] [phone].    ATENCIÓN: Si habla español, tiene a monreal disposición servicios gratuitos de asistencia lingüística. Llame al [phone].    We comply with applicable federal civil rights laws and [place] laws. We do not discriminate on the basis of race, color, national origin, age, disability, sex, sexual orientation, or gender identity.            Thank you!     Thank you for choosing [facility]  for your care. Our goal is always to provide you with excellent care. Hearing back from our patients is one way we can continue to improve our services. Please take a few minutes to complete the written survey that you may receive in the mail after your visit with us. Thank you!             Your Updated Medication List - Protect others around you: Learn how to safely use, store and throw away your medicines at www.disposemymeds.org.          This list is  accurate as of: 11/21/17  2:47 PM.  Always use your most recent med list.                   Brand Name Dispense Instructions for use Diagnosis    fish oil-omega-3 fatty acids 1000 MG capsule      Take 2 g by mouth daily        IRON SUPPLEMENT PO      Take 27 mg by mouth        prenatal multivitamin plus iron 27-0.8 MG Tabs per tablet      Take 2 tablets by mouth daily

## 2017-11-22 LAB
# FETUSES US: NORMAL
AFP ADJ MOM AMN: NORMAL
AFP SERPL-MCNC: 6 NG/ML
AGE - REPORTED: 24.8 YR
DATING METHOD: NORMAL
DIABETIC AT CONCEPTION: NO
FAMILY MEMBER DISEASES HX: NO
FAMILY MEMBER DISEASES HX: NO
GA METHOD: NORMAL
GA: 12 WEEKS
HX OF HEREDITARY DISORDERS: NO
IDDM PATIENT QL: NO
INTEGRATED SCN PATIENT-IMP: NORMAL
LMP START DATE: NORMAL
PREV HX CHROMOSOME ABNORMALITY: NO
SPECIMEN DRAWN SERPL: NORMAL
TWINS: NO

## 2017-11-27 ENCOUNTER — TRANSFERRED RECORDS (OUTPATIENT)
Dept: HEALTH INFORMATION MANAGEMENT | Facility: CLINIC | Age: 22
End: 2017-11-27

## 2017-11-27 DIAGNOSIS — O09.91 SUPERVISION OF HIGH RISK PREGNANCY IN FIRST TRIMESTER: ICD-10-CM

## 2017-11-27 PROCEDURE — 36415 COLL VENOUS BLD VENIPUNCTURE: CPT | Performed by: OBSTETRICS & GYNECOLOGY

## 2017-11-27 PROCEDURE — 99000 SPECIMEN HANDLING OFFICE-LAB: CPT | Performed by: OBSTETRICS & GYNECOLOGY

## 2017-11-27 PROCEDURE — 40000791 ZZHCL STATISTIC VERIFI PRENATAL TRISOMY 21,18,13: Mod: 90 | Performed by: OBSTETRICS & GYNECOLOGY

## 2017-12-05 ENCOUNTER — TELEPHONE (OUTPATIENT)
Dept: NURSING | Facility: CLINIC | Age: 22
End: 2017-12-05

## 2017-12-05 DIAGNOSIS — O09.91 SUPERVISION OF HIGH RISK PREGNANCY IN FIRST TRIMESTER: ICD-10-CM

## 2017-12-05 NOTE — TELEPHONE ENCOUNTER
Vickie results: Negative    Test    Result     Interpretation  Chromosome 21  No aneuploidy detected     Results consistent with two copies of chromosome 21  Chromosome 18  No aneuploidy detected  Results consistent with two copies of chromosome 18  Chromosome 13  No aneuploidy detected  Results consistent with two copies of chromosome 13  Sex Chromosome  No aneuploidy detected  Results consistent with two sex chromosomes (XY-Male)    Pt informed. Did want to know the gender. Informed Male. Routing to Dr. Michael HENRY

## 2017-12-19 ENCOUNTER — PRENATAL OFFICE VISIT (OUTPATIENT)
Dept: OBGYN | Facility: CLINIC | Age: 22
End: 2017-12-19
Payer: COMMERCIAL

## 2017-12-19 VITALS — BODY MASS INDEX: 21.28 KG/M2 | WEIGHT: 101.8 LBS

## 2017-12-19 DIAGNOSIS — O34.219 PREVIOUS CESAREAN DELIVERY, ANTEPARTUM: ICD-10-CM

## 2017-12-19 DIAGNOSIS — Z3A.16 16 WEEKS GESTATION OF PREGNANCY: ICD-10-CM

## 2017-12-19 DIAGNOSIS — O09.92 SUPERVISION OF HIGH RISK PREGNANCY IN SECOND TRIMESTER: Primary | ICD-10-CM

## 2017-12-19 DIAGNOSIS — Z36.1 NEED FOR MATERNAL SERUM ALPHA-PROTEIN (MSAFP) SCREENING: ICD-10-CM

## 2017-12-19 PROCEDURE — 82105 ALPHA-FETOPROTEIN SERUM: CPT | Mod: 90 | Performed by: OBSTETRICS & GYNECOLOGY

## 2017-12-19 PROCEDURE — 99207 ZZC PRENATAL VISIT: CPT | Performed by: OBSTETRICS & GYNECOLOGY

## 2017-12-19 PROCEDURE — 36415 COLL VENOUS BLD VENIPUNCTURE: CPT | Performed by: OBSTETRICS & GYNECOLOGY

## 2017-12-19 PROCEDURE — 99000 SPECIMEN HANDLING OFFICE-LAB: CPT | Performed by: OBSTETRICS & GYNECOLOGY

## 2017-12-19 NOTE — MR AVS SNAPSHOT
"              After Visit Summary   2017    Mile Cobos    MRN: [id number]           Patient Information     Date Of Birth          1995        Visit Information        Provider Department      2017 8:50 AM Loreta Rodriguez MD [facility] [place]        Today's Diagnoses     Supervision of high risk pregnancy in second trimester    -  1    Need for maternal serum alpha-protein (MSAFP) screening        16 weeks gestation of pregnancy        Previous  delivery, antepartum           Follow-ups after your visit        Follow-up notes from your care team     Return in about 4 weeks (around 2018) for Routine Prenatal Visit.      Future tests that were ordered for you today     Open Future Orders        Priority Expected Expires Ordered    US OB > 14 Weeks Complete Single Routine 2018            Who to contact     If you have questions or need follow up information about today's clinic visit or your schedule please contact [facility] [place] directly at [phone].  Normal or non-critical lab and imaging results will be communicated to you by MyChart, letter or phone within 4 business days after the clinic has received the results. If you do not hear from us within 7 days, please contact the clinic through [employer] or phone. If you have a critical or abnormal lab result, we will notify you by phone as soon as possible.  Submit refill requests through [employer] or call your pharmacy and they will forward the refill request to us. Please allow 3 business days for your refill to be completed.          Additional Information About Your Visit        MyChart Information     [employer] lets you send messages to your doctor, view your test results, renew your prescriptions, schedule appointments and more. To sign up, go to [email or web address] . Click on \"Log in\" on the left side of the screen, which will take you to the Welcome page. Then " "click on \"Sign up Now\" on the right side of the page.     You will be asked to enter the access code listed below, as well as some personal information. Please follow the directions to create your username and password.     Your access code is: [email or web address]  Expires: 2018  8:02 AM     Your access code will  in 90 days. If you need help or a new code, please call your [place] clinic or [phone].        Care EveryWhere ID     This is your Care EveryWhere ID. This could be used by other organizations to access your [place] medical records  [id number]        Your Vitals Were     Last Period BMI (Body Mass Index)                2017 (Exact Date) 21.28 kg/m2           Blood Pressure from Last 3 Encounters:   17 110/60   10/25/17 105/55   10/13/17 108/62    Weight from Last 3 Encounters:   17 101 lb 12.8 oz (46.2 kg)   17 101 lb 6.4 oz (46 kg)   10/25/17 106 lb (48.1 kg)              We Performed the Following     Alpha fetoprotein maternal screen        Primary Care Provider Fax #    Physician No Ref-Primary [phone]       No address on file        Equal Access to Services     HEMANT PATEL : Zo rosao Chey, waaxda luqadaha, qaybta kaalmada adeegyada, ralph bravo . So [facility] [phone].    ATENCIÓN: Si habla español, tiene a monreal disposición servicios gratuitos de asistencia lingüística. Llame al [phone].    We comply with applicable federal civil rights laws and [place] laws. We do not discriminate on the basis of race, color, national origin, age, disability, sex, sexual orientation, or gender identity.            Thank you!     Thank you for choosing [facility]  for your care. Our goal is always to provide you with excellent care. Hearing back from our patients is one way we can continue to improve our services. Please take a few minutes to complete the written survey that you may receive in the mail after " your visit with us. Thank you!             Your Updated Medication List - Protect others around you: Learn how to safely use, store and throw away your medicines at www.disposemymeds.org.          This list is accurate as of: 12/19/17  9:38 AM.  Always use your most recent med list.                   Brand Name Dispense Instructions for use Diagnosis    fish oil-omega-3 fatty acids 1000 MG capsule      Take 2 g by mouth daily        IRON SUPPLEMENT PO      Take 27 mg by mouth        prenatal multivitamin plus iron 27-0.8 MG Tabs per tablet      Take 2 tablets by mouth daily

## 2017-12-19 NOTE — PROGRESS NOTES
Doing well today.  No issues/concerns  Great energy and appetite; rare nausea with certain triggers  No vb/spotting  No bowel/bladder issues  AFP today; anatomy us at next visit  RTC 4wks

## 2017-12-20 LAB
# FETUSES US: NORMAL
AFP ADJ MOM AMN: 0.52
AFP SERPL-MCNC: 22 NG/ML
AGE - REPORTED: 24.8 YR
DATING METHOD: NORMAL
DIABETIC AT CONCEPTION: NO
FAMILY MEMBER DISEASES HX: NO
FAMILY MEMBER DISEASES HX: NO
GA METHOD: NORMAL
GA: 16 WEEKS
HX OF HEREDITARY DISORDERS: NO
IDDM PATIENT QL: NO
INTEGRATED SCN PATIENT-IMP: NORMAL
LMP START DATE: NORMAL
PREV HX CHROMOSOME ABNORMALITY: NO
SPECIMEN DRAWN SERPL: NORMAL
TWINS: NO

## 2018-01-16 ENCOUNTER — RADIANT APPOINTMENT (OUTPATIENT)
Dept: ULTRASOUND IMAGING | Facility: CLINIC | Age: 23
End: 2018-01-16
Attending: OBSTETRICS & GYNECOLOGY
Payer: COMMERCIAL

## 2018-01-16 ENCOUNTER — TELEPHONE (OUTPATIENT)
Dept: OBGYN | Facility: CLINIC | Age: 23
End: 2018-01-16

## 2018-01-16 ENCOUNTER — PRENATAL OFFICE VISIT (OUTPATIENT)
Dept: OBGYN | Facility: CLINIC | Age: 23
End: 2018-01-16
Attending: OBSTETRICS & GYNECOLOGY
Payer: COMMERCIAL

## 2018-01-16 VITALS — SYSTOLIC BLOOD PRESSURE: 106 MMHG | WEIGHT: 111 LBS | BODY MASS INDEX: 23.2 KG/M2 | DIASTOLIC BLOOD PRESSURE: 58 MMHG

## 2018-01-16 DIAGNOSIS — O09.92 SUPERVISION OF HIGH RISK PREGNANCY IN SECOND TRIMESTER: ICD-10-CM

## 2018-01-16 DIAGNOSIS — O34.219 PREVIOUS CESAREAN DELIVERY, ANTEPARTUM: Primary | ICD-10-CM

## 2018-01-16 DIAGNOSIS — Z3A.20 20 WEEKS GESTATION OF PREGNANCY: ICD-10-CM

## 2018-01-16 PROCEDURE — 76805 OB US >/= 14 WKS SNGL FETUS: CPT | Performed by: OBSTETRICS & GYNECOLOGY

## 2018-01-16 PROCEDURE — 99207 ZZC PRENATAL VISIT: CPT | Performed by: OBSTETRICS & GYNECOLOGY

## 2018-01-16 NOTE — MR AVS SNAPSHOT
"              After Visit Summary   2018    Mile Cobos    MRN: [id number]           Patient Information     Date Of Birth          1995        Visit Information        Provider Department      2018 10:40 AM Loreta Rodriguez MD [facility] [place]        Today's Diagnoses     Previous  delivery, antepartum    -  1    Supervision of high risk pregnancy in second trimester        20 weeks gestation of pregnancy           Follow-ups after your visit        Follow-up notes from your care team     Return in about 4 weeks (around 2018) for Routine Prenatal Visit.      Your next 10 appointments already scheduled     May 29, 2018   Procedure with Loreta Rodriguez MD    Birthplace (--)    [address]  [facility] [address] [phone]              Who to contact     If you have questions or need follow up information about today's clinic visit or your schedule please contact [facility] directly at [phone].  Normal or non-critical lab and imaging results will be communicated to you by [employer], letter or phone within 4 business days after the clinic has received the results. If you do not hear from us within 7 days, please contact the clinic through [employer] or phone. If you have a critical or abnormal lab result, we will notify you by phone as soon as possible.  Submit refill requests through [employer] or call your pharmacy and they will forward the refill request to us. Please allow 3 business days for your refill to be completed.          Additional Information About Your Visit        MyChart Information     [employer] lets you send messages to your doctor, view your test results, renew your prescriptions, schedule appointments and more. To sign up, go to [email or web address] . Click on \"Log in\" on the left side of the screen, which will take you to the Welcome page. Then click on \"Sign up Now\" on the right side of the page.     You " will be asked to enter the access code listed below, as well as some personal information. Please follow the directions to create your username and password.     Your access code is: [email or web address]  Expires: 2018 11:27 AM     Your access code will  in 90 days. If you need help or a new code, please call your [place] clinic or [phone].        Care EveryWhere ID     This is your Care EveryWhere ID. This could be used by other organizations to access your [place] medical records  [id number]        Your Vitals Were     Last Period Breastfeeding? BMI (Body Mass Index)             2017 (Exact Date) No 23.2 kg/m2          Blood Pressure from Last 3 Encounters:   18 106/58   17 110/60   10/25/17 105/55    Weight from Last 3 Encounters:   18 111 lb (50.3 kg)   17 101 lb 12.8 oz (46.2 kg)   17 101 lb 6.4 oz (46 kg)              Today, you had the following     No orders found for display       Primary Care Provider Fax #    Physician No Ref-Primary [phone]       No address on file        Equal Access to Services     HEMANT PATEL : Hadii estelle Guerrier, waaxda lumindy, qaybta kaalmada adedionneyada, ralph bravo . So [facility] [phone].    ATENCIÓN: Si habla español, tiene a monreal disposición servicios gratuitos de asistencia lingüística. Silverio al [phone].    We comply with applicable federal civil rights laws and [place] laws. We do not discriminate on the basis of race, color, national origin, age, disability, sex, sexual orientation, or gender identity.            Thank you!     Thank you for choosing [facility]  for your care. Our goal is always to provide you with excellent care. Hearing back from our patients is one way we can continue to improve our services. Please take a few minutes to complete the written survey that you may receive in the mail after your visit with us. Thank you!             Your  Updated Medication List - Protect others around you: Learn how to safely use, store and throw away your medicines at www.disposemymeds.org.          This list is accurate as of: 1/16/18 11:27 AM.  Always use your most recent med list.                   Brand Name Dispense Instructions for use Diagnosis    fish oil-omega-3 fatty acids 1000 MG capsule      Take 2 g by mouth daily        IRON SUPPLEMENT PO      Take 27 mg by mouth        prenatal multivitamin plus iron 27-0.8 MG Tabs per tablet      Take 2 tablets by mouth daily

## 2018-01-16 NOTE — TELEPHONE ENCOUNTER
Patient surgery scheduled on 2018 at 7am Check in 5am  Location for surgery to performed:   L&D  Scheduled by Ramez 2018     Information Packet given :Yes: HANDED 2018    CPT codes given: No            Consents signed? N/A  Rep Informed :N/A    PREOP DATE :  HOSPITAL  In Epic :Yes    On Spreadsheet :Yes    On Calendar EB  :No    In [place] Calendar KARL  :No    Assist MIDWIFE OR NONE TEXT SENT 2018   Assist in Epic PENDING  Assist Notified as needed :Yes TEXT SENT AWAITING CONFIRMATION    Kandace Sanchez  Surgery Scheduler      Patient Name:  Mile Cobos ([id number]).  :  1995        Physician requests assist from:  first assist  Requested Dates:  2018  Schedule based on:  na  Amount of time needed for the procedure:  60min                      Expected time off from work:  8wks  Surgeon:  Loreta Rodriguez MD  Surgery permit to read:  Repeat  section  Preop Needed:  No  Location for surgery to performed:   L&D  Anesthesia:  spinal   No Known Allergies  Nickel allergy:  Not Applicable  EMB: Not applicable     DIAGNOSIS:  Prior  section; declines trial of labor     Special instructions:  none  Vendor Rep:  none  Meds Needed: None

## 2018-01-16 NOTE — PROGRESS NOTES
Doing well today.  Good energy and appetite  No vb/spotting  Anatomy normal; vtx, EFW 341g, cheryl wnl, post placenta  Will schedule repeat c/s for 39wks ()  RTC 4wks      Please schedule for surgery:    Patient Name:  Mile Cobos ([id number]).  :  1995      Physician requests assist from:  first assist  Requested Dates:  2018  Schedule based on:  na  Amount of time needed for the procedure:  60min   Expected time off from work:  8wks  Surgeon:  Loreta Rodriguez MD  Surgery permit to read:  Repeat  section  Preop Needed:  No  Location for surgery to performed:   L&D  Anesthesia:  spinal   No Known Allergies  Nickel allergy:  Not Applicable  EMB: Not applicable    DIAGNOSIS:  Prior  section; declines trial of labor    Special instructions:  none  Vendor Rep:  none  Meds Needed: None

## 2018-03-06 ENCOUNTER — PRENATAL OFFICE VISIT (OUTPATIENT)
Dept: OBGYN | Facility: CLINIC | Age: 23
End: 2018-03-06
Payer: COMMERCIAL

## 2018-03-06 VITALS — BODY MASS INDEX: 24.04 KG/M2 | WEIGHT: 115 LBS | DIASTOLIC BLOOD PRESSURE: 58 MMHG | SYSTOLIC BLOOD PRESSURE: 100 MMHG

## 2018-03-06 DIAGNOSIS — Z36.9 ENCOUNTER FOR ANTENATAL SCREENING OF MOTHER: Primary | ICD-10-CM

## 2018-03-06 DIAGNOSIS — Z23 NEED FOR TDAP VACCINATION: ICD-10-CM

## 2018-03-06 DIAGNOSIS — Z3A.27 27 WEEKS GESTATION OF PREGNANCY: ICD-10-CM

## 2018-03-06 DIAGNOSIS — O34.219 PREVIOUS CESAREAN DELIVERY, ANTEPARTUM: ICD-10-CM

## 2018-03-06 DIAGNOSIS — Z29.13 NEED FOR RHOGAM DUE TO RH NEGATIVE MOTHER: ICD-10-CM

## 2018-03-06 DIAGNOSIS — O09.92 SUPERVISION OF HIGH RISK PREGNANCY IN SECOND TRIMESTER: Primary | ICD-10-CM

## 2018-03-06 LAB
BLD GP AB SCN SERPL QL: NORMAL
GLUCOSE 1H P 50 G GLC PO SERPL-MCNC: 100 MG/DL (ref 60–129)
HGB BLD-MCNC: 12.4 G/DL (ref 11.7–15.7)

## 2018-03-06 PROCEDURE — 00000218 ZZHCL STATISTIC OBHBG - HEMOGLOBIN: Performed by: OBSTETRICS & GYNECOLOGY

## 2018-03-06 PROCEDURE — 90471 IMMUNIZATION ADMIN: CPT

## 2018-03-06 PROCEDURE — 90715 TDAP VACCINE 7 YRS/> IM: CPT

## 2018-03-06 PROCEDURE — 82950 GLUCOSE TEST: CPT | Performed by: OBSTETRICS & GYNECOLOGY

## 2018-03-06 PROCEDURE — 99207 ZZC PRENATAL VISIT: CPT | Performed by: OBSTETRICS & GYNECOLOGY

## 2018-03-06 PROCEDURE — 36415 COLL VENOUS BLD VENIPUNCTURE: CPT | Performed by: OBSTETRICS & GYNECOLOGY

## 2018-03-06 PROCEDURE — 96372 THER/PROPH/DIAG INJ SC/IM: CPT

## 2018-03-06 PROCEDURE — 86850 RBC ANTIBODY SCREEN: CPT | Performed by: OBSTETRICS & GYNECOLOGY

## 2018-03-06 NOTE — MR AVS SNAPSHOT
After Visit Summary   3/6/2018    Mile Cobos    MRN: [id number]           Patient Information     Date Of Birth          1995        Visit Information        Provider Department      3/6/2018 2:00 PM Loreta Rodriguez MD [facility] [place]        Today's Diagnoses     Supervision of high risk pregnancy in second trimester    -  1    Previous  delivery, antepartum        27 weeks gestation of pregnancy           Follow-ups after your visit        Follow-up notes from your care team     Return in about 3 weeks (around 3/27/2018) for Routine Prenatal Visit.      Your next 10 appointments already scheduled     Mar 20, 2018  9:40 AM CDT   ESTABLISHED PRENATAL with Loreta Rodriguez MD   [facility] [place] [facility] [place]    [address]  [address]  [place] [id number] [phone]            May 29, 2018   Procedure with Loreta Rodriguez MD    Birthplace (--)    [address]  [place] [address] [phone]              Who to contact     If you have questions or need follow up information about today's clinic visit or your schedule please contact [facility] directly at [phone].  Normal or non-critical lab and imaging results will be communicated to you by MyChart, letter or phone within 4 business days after the clinic has received the results. If you do not hear from us within 7 days, please contact the clinic through [employer] or phone. If you have a critical or abnormal lab result, we will notify you by phone as soon as possible.  Submit refill requests through [employer] or call your pharmacy and they will forward the refill request to us. Please allow 3 business days for your refill to be completed.          Additional Information About Your Visit        Easy [employer] Information     Last[employer]fm lets you send messages to your doctor, view your test results, renew your prescriptions, schedule  "appointments and more. To sign up, go to [email or web address] . Click on \"Log in\" on the left side of the screen, which will take you to the Welcome page. Then click on \"Sign up Now\" on the right side of the page.     You will be asked to enter the access code listed below, as well as some personal information. Please follow the directions to create your username and password.     Your access code is: [email or web address]  Expires: 2018 11:27 AM     Your access code will  in 90 days. If you need help or a new code, please call your [place] clinic or [phone].        Care EveryWhere ID     This is your Care EveryWhere ID. This could be used by other organizations to access your [place] medical records  [id number]        Your Vitals Were     Last Period BMI (Body Mass Index)                2017 (Exact Date) 24.04 kg/m2           Blood Pressure from Last 3 Encounters:   18 100/58   18 106/58   17 110/60    Weight from Last 3 Encounters:   18 115 lb (52.2 kg)   18 111 lb (50.3 kg)   17 101 lb 12.8 oz (46.2 kg)              Today, you had the following     No orders found for display       Primary Care Provider Fax #    Physician No Ref-Primary [phone]       No address on file        Equal Access to Services     HEMANT PATEL : Hadii estelle rosao Chey, waaxda luqadaha, qaybta kaalmada eveline, ralph bravo . So [facility] [phone].    ATENCIÓN: Si habla español, tiene a monreal disposición servicios gratuitos de asistencia lingüística. Llame al [phone].    We comply with applicable federal civil rights laws and [place] laws. We do not discriminate on the basis of race, color, national origin, age, disability, sex, sexual orientation, or gender identity.            Thank you!     Thank you for choosing [facility]  for your care. Our goal is always to provide you with excellent care. Hearing back from our " patients is one way we can continue to improve our services. Please take a few minutes to complete the written survey that you may receive in the mail after your visit with us. Thank you!             Your Updated Medication List - Protect others around you: Learn how to safely use, store and throw away your medicines at www.disposemymeds.org.          This list is accurate as of 3/6/18  5:01 PM.  Always use your most recent med list.                   Brand Name Dispense Instructions for use Diagnosis    fish oil-omega-3 fatty acids 1000 MG capsule      Take 2 g by mouth daily        IRON SUPPLEMENT PO      Take 27 mg by mouth        prenatal multivitamin plus iron 27-0.8 MG Tabs per tablet      Take 2 tablets by mouth daily

## 2018-03-27 PROBLEM — O09.90 SUPERVISION OF HIGH-RISK PREGNANCY: Status: ACTIVE | Noted: 2017-10-25

## 2018-03-30 ENCOUNTER — PRENATAL OFFICE VISIT (OUTPATIENT)
Dept: OBGYN | Facility: CLINIC | Age: 23
End: 2018-03-30
Payer: COMMERCIAL

## 2018-03-30 VITALS — SYSTOLIC BLOOD PRESSURE: 112 MMHG | DIASTOLIC BLOOD PRESSURE: 60 MMHG | BODY MASS INDEX: 25.5 KG/M2 | WEIGHT: 122 LBS

## 2018-03-30 DIAGNOSIS — O09.93 SUPERVISION OF HIGH RISK PREGNANCY IN THIRD TRIMESTER: ICD-10-CM

## 2018-03-30 DIAGNOSIS — Z3A.30 30 WEEKS GESTATION OF PREGNANCY: Primary | ICD-10-CM

## 2018-03-30 DIAGNOSIS — O34.219 PREVIOUS CESAREAN DELIVERY, ANTEPARTUM: ICD-10-CM

## 2018-03-30 PROCEDURE — 99207 ZZC PRENATAL VISIT: CPT | Performed by: OBSTETRICS & GYNECOLOGY

## 2018-03-30 NOTE — PROGRESS NOTES
Doing well today.  No issues/concerns  +FM  Occ BH contractions; no cramping/vb/lof  Occ leg/foot cramps --discussed hydration and magnesium supplements  RTC 2wks

## 2018-03-30 NOTE — MR AVS SNAPSHOT
After Visit Summary   3/30/2018    Mile Cobos    MRN: [id number]           Patient Information     Date Of Birth          1995        Visit Information        Provider Department      3/30/2018 8:50 AM Loreta Rodriguez MD [facility] [place]        Today's Diagnoses     30 weeks gestation of pregnancy    -  1    Supervision of high risk pregnancy in third trimester        Previous  delivery, antepartum           Follow-ups after your visit        Follow-up notes from your care team     Return in about 2 weeks (around 2018) for Routine Prenatal Visit.      Your next 10 appointments already scheduled     2018 11:20 AM CDT   ESTABLISHED PRENATAL with Loreta Rodriguez MD   [facility] [place] ([facility] [place])    [address]  [address]  [place] [address] [phone]            May 29, 2018   Procedure with Loreta Rodriguez MD    Birthplace (--)    [address]  [place] [address] [phone]              Who to contact     If you have questions or need follow up information about today's clinic visit or your schedule please contact [facility] directly at [phone].  Normal or non-critical lab and imaging results will be communicated to you by MyChart, letter or phone within 4 business days after the clinic has received the results. If you do not hear from us within 7 days, please contact the clinic through "[employer]hart or phone. If you have a critical or abnormal lab result, we will notify you by phone as soon as possible.  Submit refill requests through [employer] or call your pharmacy and they will forward the refill request to us. Please allow 3 business days for your refill to be completed.          Additional Information About Your Visit        "[employer] Information     [employer] lets you send messages to your doctor, view your test results, renew your prescriptions, schedule  "appointments and more. To sign up, go to [email or web address] . Click on \"Log in\" on the left side of the screen, which will take you to the Welcome page. Then click on \"Sign up Now\" on the right side of the page.     You will be asked to enter the access code listed below, as well as some personal information. Please follow the directions to create your username and password.     Your access code is: [email or web address]  Expires: 2018 12:27 PM     Your access code will  in 90 days. If you need help or a new code, please call your [place] clinic or [phone].        Care EveryWhere ID     This is your Care EveryWhere ID. This could be used by other organizations to access your [place] medical records  [id number]        Your Vitals Were     Last Period Breastfeeding? BMI (Body Mass Index)             2017 (Exact Date) No 25.5 kg/m2          Blood Pressure from Last 3 Encounters:   18 112/60   18 100/58   18 106/58    Weight from Last 3 Encounters:   18 122 lb (55.3 kg)   18 115 lb (52.2 kg)   18 111 lb (50.3 kg)              Today, you had the following     No orders found for display       Primary Care Provider Fax #    Physician No Ref-Primary [phone]       No address on file        Equal Access to Services     HEMANT PATEL : Hadii estelle rosao Sosammy, waaxda luqadaha, qaybta kaalmada ademanuelda, ralph bravo . So [facility] [phone].    ATENCIÓN: Si habla español, tiene a monrela disposición servicios gratuitos de asistencia lingüística. Llame al [phone].    We comply with applicable federal civil rights laws and [place] laws. We do not discriminate on the basis of race, color, national origin, age, disability, sex, sexual orientation, or gender identity.            Thank you!     Thank you for choosing [facility]  for your care. Our goal is always to provide you with excellent care. Hearing back from " our patients is one way we can continue to improve our services. Please take a few minutes to complete the written survey that you may receive in the mail after your visit with us. Thank you!             Your Updated Medication List - Protect others around you: Learn how to safely use, store and throw away your medicines at www.disposemymeds.org.          This list is accurate as of 3/30/18  9:16 AM.  Always use your most recent med list.                   Brand Name Dispense Instructions for use Diagnosis    fish oil-omega-3 fatty acids 1000 MG capsule      Take 2 g by mouth daily        IRON SUPPLEMENT PO      Take 27 mg by mouth        prenatal multivitamin plus iron 27-0.8 MG Tabs per tablet      Take 2 tablets by mouth daily

## 2018-04-06 ENCOUNTER — HOSPITAL ENCOUNTER (EMERGENCY)
Facility: CLINIC | Age: 23
Discharge: HOME | End: 2018-04-06
Attending: EMERGENCY MEDICINE | Admitting: EMERGENCY MEDICINE
Payer: COMMERCIAL

## 2018-04-06 ENCOUNTER — APPOINTMENT (OUTPATIENT)
Dept: ULTRASOUND IMAGING | Facility: CLINIC | Age: 23
End: 2018-04-06
Attending: EMERGENCY MEDICINE
Payer: COMMERCIAL

## 2018-04-06 ENCOUNTER — TELEPHONE (OUTPATIENT)
Dept: OBGYN | Facility: CLINIC | Age: 23
End: 2018-04-06

## 2018-04-06 VITALS
DIASTOLIC BLOOD PRESSURE: 69 MMHG | OXYGEN SATURATION: 98 % | RESPIRATION RATE: 16 BRPM | TEMPERATURE: 97.6 F | SYSTOLIC BLOOD PRESSURE: 98 MMHG

## 2018-04-06 DIAGNOSIS — M79.604 PAIN OF RIGHT LOWER EXTREMITY: ICD-10-CM

## 2018-04-06 DIAGNOSIS — M25.471 RIGHT ANKLE SWELLING: ICD-10-CM

## 2018-04-06 LAB
ALBUMIN SERPL-MCNC: 2.7 G/DL (ref 3.4–5)
ALBUMIN UR-MCNC: NEGATIVE MG/DL
ALP SERPL-CCNC: 147 U/L (ref 40–150)
ALT SERPL W P-5'-P-CCNC: 15 U/L (ref 0–50)
ANION GAP SERPL CALCULATED.3IONS-SCNC: 7 MMOL/L (ref 3–14)
APPEARANCE UR: CLEAR
AST SERPL W P-5'-P-CCNC: 17 U/L (ref 0–45)
BACTERIA #/AREA URNS HPF: ABNORMAL /HPF
BASOPHILS # BLD AUTO: 0 10E9/L (ref 0–0.2)
BASOPHILS NFR BLD AUTO: 0.3 %
BILIRUB SERPL-MCNC: 0.2 MG/DL (ref 0.2–1.3)
BILIRUB UR QL STRIP: NEGATIVE
BUN SERPL-MCNC: 5 MG/DL (ref 7–30)
CALCIUM SERPL-MCNC: 8.8 MG/DL (ref 8.5–10.1)
CHLORIDE SERPL-SCNC: 105 MMOL/L (ref 94–109)
CO2 SERPL-SCNC: 26 MMOL/L (ref 20–32)
COLOR UR AUTO: ABNORMAL
CREAT SERPL-MCNC: 0.48 MG/DL (ref 0.52–1.04)
DIFFERENTIAL METHOD BLD: NORMAL
EOSINOPHIL # BLD AUTO: 0 10E9/L (ref 0–0.7)
EOSINOPHIL NFR BLD AUTO: 0.1 %
ERYTHROCYTE [DISTWIDTH] IN BLOOD BY AUTOMATED COUNT: 12.2 % (ref 10–15)
GFR SERPL CREATININE-BSD FRML MDRD: >90 ML/MIN/1.7M2
GLUCOSE SERPL-MCNC: 72 MG/DL (ref 70–99)
GLUCOSE UR STRIP-MCNC: NEGATIVE MG/DL
HCT VFR BLD AUTO: 36.8 % (ref 35–47)
HGB BLD-MCNC: 12.4 G/DL (ref 11.7–15.7)
HGB UR QL STRIP: ABNORMAL
IMM GRANULOCYTES # BLD: 0 10E9/L (ref 0–0.4)
IMM GRANULOCYTES NFR BLD: 0.4 %
KETONES UR STRIP-MCNC: NEGATIVE MG/DL
LDH SERPL L TO P-CCNC: 151 U/L (ref 81–234)
LEUKOCYTE ESTERASE UR QL STRIP: NEGATIVE
LYMPHOCYTES # BLD AUTO: 2.5 10E9/L (ref 0.8–5.3)
LYMPHOCYTES NFR BLD AUTO: 26.5 %
MCH RBC QN AUTO: 29.3 PG (ref 26.5–33)
MCHC RBC AUTO-ENTMCNC: 33.7 G/DL (ref 31.5–36.5)
MCV RBC AUTO: 87 FL (ref 78–100)
MONOCYTES # BLD AUTO: 0.9 10E9/L (ref 0–1.3)
MONOCYTES NFR BLD AUTO: 9.7 %
MUCOUS THREADS #/AREA URNS LPF: PRESENT /LPF
NEUTROPHILS # BLD AUTO: 5.8 10E9/L (ref 1.6–8.3)
NEUTROPHILS NFR BLD AUTO: 63 %
NITRATE UR QL: NEGATIVE
NRBC # BLD AUTO: 0 10*3/UL
NRBC BLD AUTO-RTO: 0 /100
PH UR STRIP: 7 PH (ref 5–7)
PLATELET # BLD AUTO: 240 10E9/L (ref 150–450)
POTASSIUM SERPL-SCNC: 3.7 MMOL/L (ref 3.4–5.3)
PROT SERPL-MCNC: 7.2 G/DL (ref 6.8–8.8)
RBC # BLD AUTO: 4.23 10E12/L (ref 3.8–5.2)
RBC #/AREA URNS AUTO: 1 /HPF (ref 0–2)
SODIUM SERPL-SCNC: 138 MMOL/L (ref 133–144)
SOURCE: ABNORMAL
SP GR UR STRIP: 1 (ref 1–1.03)
SQUAMOUS #/AREA URNS AUTO: 1 /HPF (ref 0–1)
UROBILINOGEN UR STRIP-MCNC: 0 MG/DL (ref 0–2)
WBC # BLD AUTO: 9.3 10E9/L (ref 4–11)
WBC #/AREA URNS AUTO: <1 /HPF (ref 0–5)

## 2018-04-06 PROCEDURE — 80053 COMPREHEN METABOLIC PANEL: CPT | Performed by: EMERGENCY MEDICINE

## 2018-04-06 PROCEDURE — 81001 URINALYSIS AUTO W/SCOPE: CPT | Performed by: EMERGENCY MEDICINE

## 2018-04-06 PROCEDURE — 85025 COMPLETE CBC W/AUTO DIFF WBC: CPT | Performed by: EMERGENCY MEDICINE

## 2018-04-06 PROCEDURE — 99285 EMERGENCY DEPT VISIT HI MDM: CPT | Mod: 25

## 2018-04-06 PROCEDURE — 36415 COLL VENOUS BLD VENIPUNCTURE: CPT | Performed by: EMERGENCY MEDICINE

## 2018-04-06 PROCEDURE — 93971 EXTREMITY STUDY: CPT | Mod: RT

## 2018-04-06 PROCEDURE — 83615 LACTATE (LD) (LDH) ENZYME: CPT | Performed by: EMERGENCY MEDICINE

## 2018-04-06 PROCEDURE — 87086 URINE CULTURE/COLONY COUNT: CPT | Performed by: EMERGENCY MEDICINE

## 2018-04-06 ASSESSMENT — ENCOUNTER SYMPTOMS
CHILLS: 0
VOMITING: 0
DIFFICULTY URINATING: 0
ABDOMINAL PAIN: 1
NAUSEA: 0
JOINT SWELLING: 1
FEVER: 0

## 2018-04-06 NOTE — ED NOTES
Patient arrives here for evaluation of right leg swelling and pain that started one week ago. Denies history of blood clots. AOX4, ABC's intact

## 2018-04-06 NOTE — LETTER
April 6, 2018      To Whom It May Concern:      Mile Cobos was seen in our Emergency Department today, 04/06/18.  I expect her condition to improve over the next 1-2 days.  She may return to work/school when improved.    Sincerely,        Mechelle Blue RN

## 2018-04-06 NOTE — ED PROVIDER NOTES
History     Chief Complaint:  Leg Swelling      The history is provided by the patient.      Mile Cobos is an 8 month pregnant 22 year old female who presents with right leg swelling.  The patient has been having right leg swelling for around two weeks, and called her OB/GYN clinic who recommended that she come to the ED for further evaluation. The patient is followed by Dr. Rodriguez at the [facility]'s [place] in [place].  Here in the ED, the patient says that her right thigh, right knee, and the arch of her right foot are swollen and she has right sided abdominal pain that started around the same time as her leg swelling.  The patient says that she also gets jean horses in her right leg, has frequent itching in the right leg, and her urine has been darker than usual.  However, the patient denies nausea, fever, chills, vomiting, difficulty urinating, rash, or previous liver/gallbladder problems.    Allergies:  No known drug allergies    Medications:    Prenatal Vit-Fe Fumarate-FA  Fish oil  Iron supplement    Problem List:    Supervision of high risk pregnancy     Past Medical History:    Abnormal pap smear of cervix  Anxiety  History of chlamydia  History of gonorrhea  IUD contraception    Past Surgical History:     section    Family History:    HIV/AIDS    Social History:  Smoking Status: Former Smoker  Alcohol Use: No  Marital Status:   [2]    Review of Systems   Constitutional: Negative for chills and fever.   Gastrointestinal: Positive for abdominal pain. Negative for nausea and vomiting.   Genitourinary: Negative for difficulty urinating.   Musculoskeletal: Positive for joint swelling.        Right leg itching    Skin: Negative for rash.   All other systems reviewed and are negative.      Physical Exam     Patient Vitals for the past 24 hrs:   BP Temp Temp src Heart Rate Resp SpO2   18 1934 - - - - - 98 %   18 1933 98/69 - - - - -   18 1700 - - - - - 99 %    04/06/18 1658 117/71 97.6  F (36.4  C) Oral 79 16 100 %       Physical Exam  Constitutional: Patient appears well-developed and well-nourished. Patient is in minimal distress  HENT:                         Head: No external signs of trauma noted.                        Eyes: Conjunctivae are normal. Pupils are equal, round, and reactive to light.   Cardiovascular:                         Normal rate, regular rhythm and normal heart sounds.                          Exam reveals no friction rub.                          No murmur heard.  Pulmonary/Chest:                         Effort normal and breath sounds normal.                         No respiratory distress.                         There are no wheezes.                         There are no rales.   Abdominal:                         Soft.                         Bowel sounds normal.                         There is no distension.                         There is no focal abdominal tenderness.                         There is no rebound or guarding.   Musculoskeletal:                         Normal range of motion.                         Normal Tone                        There is minimal swelling near the right medial malleolus.                         There is tenderness to palpation at the right popliteal fossa as well as the proximal calf and distal medial hamstring.  Neurological: Patient is alert and oriented to person, place, and time.   Skin: Skin is warm and dry. Patient is not diaphoretic. No jaundice noted.    Emergency Department Course     Imaging:  Radiographic findings were communicated with the patient who voiced understanding of the findings.  US Lower Extremity Venous Duplex Right:  Negative for deep venous thrombosis in the right lower  extremity.   As per radiology.     POC US Abdomen, Limited:  INTERPRETATION: Normal pelvic ultrasound with intrauterine pregnancy present. FHR was 143 with noted fetal activity.     Performed by  me     Laboratory:  UA with micro: Urine blood small (A), Bacteria Few (A), Mucous urine Present (A) o/w negative  Urine culture: In process   Lactate Dehydrogenase: 151  CBC: WBC: 9.3, HGB: 12.4, PLT: 240  CMP:  BUN 5(L), Creatinine 0.48(L), Albumin 2.7(L) o/w WNL    Procedure    POC US Abdomen, Limited:  INDICATIONS/SYMPTOM: Assess for FHT  PROBE: Low frequency convex probe  Type of US Study: Transabdominal Exam  BODY LOCATION: Pelvis  FINDINGS: Fetal heart rate: Present and counted at 143 bpm.  INTERPRETATION: Normal pelvic ultrasound with intrauterine pregnancy present. FHR was 143 with noted fetal activity.    IMAGE DOCUMENTATION: Images were archived to PACs system.     Performed by me     Emergency Department Course:  Nursing notes and vitals reviewed. 1706 I performed an exam of the patient as documented above.     IV inserted.  This was sent to the lab for further testing, results above.  The patient provided a urine sample here in the emergency department. This was sent for laboratory testing, findings above.     The patient was sent for a US lower extremity Venous Duplex Right while in the emergency department, findings above.     1800: I rechecked the patient and discussed the results of her workup thus far.  2000: I discussed the case with Dr. Rosen regarding the patient.  2007: I rechecked the patient.      Findings and plan explained to the Patient. Patient discharged home with instructions regarding supportive care, medications, and reasons to return. The importance of close follow-up was reviewed.     I personally reviewed the laboratory results with the Patient and answered all related questions prior to discharge.       Impression & Plan      Medical Decision Making:  Patient presents to the ED right lower extremity pain.  Please see the HPI and physical exam for specifics.  Patient has remained well in the ED.  Bedside US demonstrated good fetal HR and movement.  US of the patients right lower  extremity is noted above and is negative for DVT.  Given some local itching I did order some blood work which is noted above and seems normal and would suggest against Hellp syndrome.  I discussed the case with the covering OB/GYN for the patients clinic and at this time we will discharge the patient to follow up in the outpatient setting.  Anticipatory guidance given prior to discharge.    Diagnosis:    ICD-10-CM    1. Pain of right lower extremity M79.604 Urine Culture   2. Right ankle swelling M25.471        Disposition:  discharged to home    Remington Mckeon  4/6/2018   [facility] EMERGENCY DEPARTMENT  I, Remington Mckeon, am serving as a scribe at 5:06 PM on 4/6/2018 to document services personally performed by Justino Morales DO based on my observations and the provider's statements to me.        Justino Morales DO  04/07/18 0014

## 2018-04-06 NOTE — ED AVS SNAPSHOT
[facility] Emergency Department    [address]    [facility] [address]    Phone:  [phone]    Fax:  [phone]                                       Mile Cobos   MRN: [id number]    Department:  [facility] Emergency Department   Date of Visit:  4/6/2018           After Visit Summary Signature Page     I have received my discharge instructions, and my questions have been answered. I have discussed any challenges I see with this plan with the nurse or doctor.    ..........................................................................................................................................  Patient/Patient Representative Signature      ..........................................................................................................................................  Patient Representative Print Name and Relationship to Patient    ..................................................               ................................................  Date                                            Time    ..........................................................................................................................................  Reviewed by Signature/Title    ...................................................              ..............................................  Date                                                            Time

## 2018-04-06 NOTE — ED AVS SNAPSHOT
[facility] Emergency Department    [address]    [facility] [address]    Phone:  [phone]    Fax:  [phone]                                       Mile Cobos   MRN: [id number]    Department:  [facility] Emergency Department   Date of Visit:  4/6/2018           Patient Information     Date Of Birth          1995        Your diagnoses for this visit were:     Pain of right lower extremity     Right ankle swelling        You were seen by Justino Morales DO.      Follow-up Information     Follow up with Clinic, [facility] [place]. Call on 4/9/2018.    Why:  To establish follow up appointment    Contact information:    [facility]  [address]  [place] [id number] [phone]          Follow up with [facility] Emergency Department.    Specialty:  EMERGENCY MEDICINE    Why:  If symptoms worsen    Contact information:    [address]  [facility] [address] [phone]        Discharge Instructions         Leg Swelling in a Single Leg  Swelling of the arms, feet, ankles, and legs is called edema. It is caused by extra fluid collecting in the tissues. Because of gravity, extra fluid in the body settles to the lowest part. That is why the legs and feet are most affected. You have swelling in a single leg.  Some of the causes for swelling in only a single leg include:    Infection in the foot or leg    Long-term problem with a vein not working well (venous insufficiency)    Swollen, twisted vein in the leg (varicose veins)    Insect bite or sting on the foot or leg    Injury or recent surgery on the foot or leg    Blood clot in a deep vein of the leg (deep vein thrombosis or DVT)    Inflammation of the joints of the lower leg  Medical treatment will depend on what is causing your swelling.  Home care  Follow these guidelines when caring for yourself at home:    Don t wear  tight clothing.    Keep your legs up while lying or sitting.    Take any medicines as directed.    If infection, injury, or recent surgery is the cause of your swelling, stay off your legs as much as possible until your symptoms get better.    If you have venous insufficiency or varicose veins, don t sit or  one place for long periods of time. Take breaks and walk around every few hours. Talk with your healthcare provider about wearing support stockings to help lessen swelling during the day.    Wear compression stockings with your doctor's approval  Follow-up care  Follow up with your healthcare provider as advised.  Call 911  Call 911 if any of these occur:    Shortness of breath or trouble breathing    Chest pain    Coughing up blood    Fainting or loss of consciousness   When to seek medical advice  Call your healthcare provider right away if any of these occur:    Increased pain, swelling, warmth, or redness of the leg, ankle, or foot    Fever of 100.4 F (38 C) or higher, or as directed by your healthcare provider    Weakness or dizziness    Shaking chills    Drenching sweats  Date Last Reviewed: 4/11/2016 2000-2017 [employer]. [address] [place] [address]. All rights reserved. This information is not intended as a substitute for professional medical care. Always follow your healthcare professional's instructions.          Your next 10 appointments already scheduled     Apr 11, 2018 11:20 AM CDT   ESTABLISHED PRENATAL with Loreta Rodriguez MD   [facility] [place] ([facility])    [address]  [facility] [address]   [phone]            May 29, 2018   Procedure with Loreta Rodriguez MD    [facility] (--)    [address]  [facility] [address] [phone]              24 Hour Appointment Hotline       To make an appointment at any [facility], call [phone] ([phone]). If you don't  have a family doctor or clinic, we will help you find one. [facility] are conveniently located to serve the needs of you and your family.             Review of your medicines      Our records show that you are taking the medicines listed below. If these are incorrect, please call your family doctor or clinic.        Dose / Directions Last dose taken    fish oil-omega-3 fatty acids 1000 MG capsule   Dose:  2 g        Take 2 g by mouth daily   Refills:  0        IRON SUPPLEMENT PO   Dose:  27 mg        Take 27 mg by mouth   Refills:  0        prenatal multivitamin plus iron 27-0.8 MG Tabs per tablet   Dose:  2 tablet        Take 2 tablets by mouth daily   Refills:  0                Procedures and tests performed during your visit     CBC with platelets differential    Comprehensive metabolic panel    Lactate Dehydrogenase    POC US ABDOMEN LIMITED    UA with Microscopic    US Lower Extremity Venous Duplex Right    Urine Culture      Orders Needing Specimen Collection     None      Pending Results     Date and Time Order Name Status Description    4/6/2018 1722 Urine Culture In process             Pending Culture Results     Date and Time Order Name Status Description    4/6/2018 1722 Urine Culture In process             Pending Results Instructions     If you had any lab results that were not finalized at the time of your Discharge, you can call the ED Lab Result RN at [phone]. You will be contacted by this team for any positive Lab results or changes in treatment. The nurses are available 7 days a week from 10A to 6:30P.  You can leave a message 24 hours per day and they will return your call.        Test Results From Your Hospital Stay        4/6/2018  6:07 PM      Component Results     Component Value Ref Range & Units Status    Lactate Dehydrogenase 151 81 - 234 U/L Final         4/6/2018  6:47 PM      Component Results     Component Value Ref Range & Units Status    Color Urine Straw  Final     Appearance Urine Clear  Final    Glucose Urine Negative NEG^Negative mg/dL Final    Bilirubin Urine Negative NEG^Negative Final    Ketones Urine Negative NEG^Negative mg/dL Final    Specific Gravity Urine 1.005 1.003 - 1.035 Final    Blood Urine Small (A) NEG^Negative Final    pH Urine 7.0 5.0 - 7.0 pH Final    Protein Albumin Urine Negative NEG^Negative mg/dL Final    Urobilinogen mg/dL 0.0 0.0 - 2.0 mg/dL Final    Nitrite Urine Negative NEG^Negative Final    Leukocyte Esterase Urine Negative NEG^Negative Final    Source Midstream Urine  Final    WBC Urine <1 0 - 5 /HPF Final    RBC Urine 1 0 - 2 /HPF Final    Bacteria Urine Few (A) NEG^Negative /HPF Final    Squamous Epithelial /HPF Urine 1 0 - 1 /HPF Final    Mucous Urine Present (A) NEG^Negative /LPF Final         4/6/2018  6:36 PM         4/6/2018  8:07 PM      Narrative     VENOUS ULTRASOUND RIGHT LOWER EXTREMITY  4/6/2018 7:11 PM     HISTORY: Right lower extremity swelling and pain, concern for DVT.    COMPARISON: None.    TECHNIQUE: Color Doppler and spectral waveform analysis performed  throughout the deep veins of the right lower extremity.    FINDINGS: The right common femoral, proximal greater saphenous,  femoral, and popliteal veins demonstrate normal blood flow,  compression, and augmentation. Posterior tibial and peroneal veins are  compressible. Contralateral left common femoral vein is patent.        Impression     IMPRESSION: Negative for deep venous thrombosis in the right lower  extremity.    JD MÁRQUEZ MD         4/6/2018  6:12 PM      Impression     [facility] Procedure Note     Limited Bedside ED Pelvic Ultrasound (PREGNANT PATIENT):    PROCEDURE: PERFORMED BY: Dr. Justino Morales  INDICATIONS/SYMPTOM: Assess for FHT  PROBE: Low frequency convex probe  Type of US Study: Transabdominal Exam  BODY LOCATION: Pelvis  FINDINGS: Fetal heart rate: Present and counted at 143 bpm.  INTERPRETATION: Normal pelvic ultrasound with intrauterine  pregnancy present. FHR was 143 with noted fetal activity.    IMAGE DOCUMENTATION: Images were archived to PACs system.           4/6/2018  5:58 PM      Component Results     Component Value Ref Range & Units Status    WBC 9.3 4.0 - 11.0 10e9/L Final    RBC Count 4.23 3.8 - 5.2 10e12/L Final    Hemoglobin 12.4 11.7 - 15.7 g/dL Final    Hematocrit 36.8 35.0 - 47.0 % Final    MCV 87 78 - 100 fl Final    MCH 29.3 26.5 - 33.0 pg Final    MCHC 33.7 31.5 - 36.5 g/dL Final    RDW 12.2 10.0 - 15.0 % Final    Platelet Count 240 150 - 450 10e9/L Final    Diff Method Automated Method  Final    % Neutrophils 63.0 % Final    % Lymphocytes 26.5 % Final    % Monocytes 9.7 % Final    % Eosinophils 0.1 % Final    % Basophils 0.3 % Final    % Immature Granulocytes 0.4 % Final    Nucleated RBCs 0 0 /100 Final    Absolute Neutrophil 5.8 1.6 - 8.3 10e9/L Final    Absolute Lymphocytes 2.5 0.8 - 5.3 10e9/L Final    Absolute Monocytes 0.9 0.0 - 1.3 10e9/L Final    Absolute Eosinophils 0.0 0.0 - 0.7 10e9/L Final    Absolute Basophils 0.0 0.0 - 0.2 10e9/L Final    Abs Immature Granulocytes 0.0 0 - 0.4 10e9/L Final    Absolute Nucleated RBC 0.0  Final         4/6/2018  6:07 PM      Component Results     Component Value Ref Range & Units Status    Sodium 138 133 - 144 mmol/L Final    Potassium 3.7 3.4 - 5.3 mmol/L Final    Chloride 105 94 - 109 mmol/L Final    Carbon Dioxide 26 20 - 32 mmol/L Final    Anion Gap 7 3 - 14 mmol/L Final    Glucose 72 70 - 99 mg/dL Final    Urea Nitrogen 5 (L) 7 - 30 mg/dL Final    Creatinine 0.48 (L) 0.52 - 1.04 mg/dL Final    GFR Estimate >90 >60 mL/min/1.7m2 Final    Non  GFR Calc    GFR Estimate If Black >90 >60 mL/min/1.7m2 Final    African American GFR Calc    Calcium 8.8 8.5 - 10.1 mg/dL Final    Bilirubin Total 0.2 0.2 - 1.3 mg/dL Final    Albumin 2.7 (L) 3.4 - 5.0 g/dL Final    Protein Total 7.2 6.8 - 8.8 g/dL Final    Alkaline Phosphatase 147 40 - 150 U/L Final    ALT 15 0 - 50 U/L Final     AST 17 0 - 45 U/L Final                Clinical Quality Measure: Blood Pressure Screening     Your blood pressure was checked while you were in the emergency department today. The last reading we obtained was  BP: 98/69 . Please read the guidelines below about what these numbers mean and what you should do about them.  If your systolic blood pressure (the top number) is less than 120 and your diastolic blood pressure (the bottom number) is less than 80, then your blood pressure is normal. There is nothing more that you need to do about it.  If your systolic blood pressure (the top number) is 120-139 or your diastolic blood pressure (the bottom number) is 80-89, your blood pressure may be higher than it should be. You should have your blood pressure rechecked within a year by a primary care provider.  If your systolic blood pressure (the top number) is 140 or greater or your diastolic blood pressure (the bottom number) is 90 or greater, you may have high blood pressure. High blood pressure is treatable, but if left untreated over time it can put you at risk for heart attack, stroke, or kidney failure. You should have your blood pressure rechecked by a primary care provider within the next 4 weeks.  If your provider in the emergency department today gave you specific instructions to follow-up with your doctor or provider even sooner than that, you should follow that instruction and not wait for up to 4 weeks for your follow-up visit.        Thank you for choosing [facility]       Thank you for choosing [facility] for your care. Our goal is always to provide you with excellent care. Hearing back from our patients is one way we can continue to improve our services. Please take a few minutes to complete the written survey that you may receive in the mail after you visit with us. Thank you!        [employer] Information     [employer] lets you send messages to your doctor, view your test results, renew your prescriptions, schedule  "appointments and more. To sign up, go to [email or web address] . Click on \"Log in\" on the left side of the screen, which will take you to the Welcome page. Then click on \"Sign up Now\" on the right side of the page.     You will be asked to enter the access code listed below, as well as some personal information. Please follow the directions to create your username and password.     Your access code is: [email or web address]  Expires: 2018 12:27 PM     Your access code will  in 90 days. If you need help or a new code, please call your [place] clinic or [phone].        Care EveryWhere ID     This is your Care EveryWhere ID. This could be used by other organizations to access your [place] medical records  [id number]        Equal Access to Services     HEMANT PATEL : Zo Guerrier, mattie kidd, kleber mosquera, ralph bravo . So [facility] [phone].    ATENCIÓN: Si habla español, tiene a monreal disposición servicios gratuitos de asistencia lingüística. Llame al [phone].    We comply with applicable federal civil rights laws and [place] laws. We do not discriminate on the basis of race, color, national origin, age, disability, sex, sexual orientation, or gender identity.            After Visit Summary       This is your record. Keep this with you and show to your community pharmacist(s) and doctor(s) at your next visit.                  "

## 2018-04-07 LAB
BACTERIA SPEC CULT: NO GROWTH
Lab: NORMAL
SPECIMEN SOURCE: NORMAL

## 2018-04-07 NOTE — DISCHARGE INSTRUCTIONS
Leg Swelling in a Single Leg  Swelling of the arms, feet, ankles, and legs is called edema. It is caused by extra fluid collecting in the tissues. Because of gravity, extra fluid in the body settles to the lowest part. That is why the legs and feet are most affected. You have swelling in a single leg.  Some of the causes for swelling in only a single leg include:    Infection in the foot or leg    Long-term problem with a vein not working well (venous insufficiency)    Swollen, twisted vein in the leg (varicose veins)    Insect bite or sting on the foot or leg    Injury or recent surgery on the foot or leg    Blood clot in a deep vein of the leg (deep vein thrombosis or DVT)    Inflammation of the joints of the lower leg  Medical treatment will depend on what is causing your swelling.  Home care  Follow these guidelines when caring for yourself at home:    Don t wear tight clothing.    Keep your legs up while lying or sitting.    Take any medicines as directed.    If infection, injury, or recent surgery is the cause of your swelling, stay off your legs as much as possible until your symptoms get better.    If you have venous insufficiency or varicose veins, don t sit or  one place for long periods of time. Take breaks and walk around every few hours. Talk with your healthcare provider about wearing support stockings to help lessen swelling during the day.    Wear compression stockings with your doctor's approval  Follow-up care  Follow up with your healthcare provider as advised.  Call 911  Call 911 if any of these occur:    Shortness of breath or trouble breathing    Chest pain    Coughing up blood    Fainting or loss of consciousness   When to seek medical advice  Call your healthcare provider right away if any of these occur:    Increased pain, swelling, warmth, or redness of the leg, ankle, or foot    Fever of 100.4 F (38 C) or higher, or as directed by your healthcare provider    Weakness or  dizziness    Shaking chills    Drenching sweats  Date Last Reviewed: 4/11/2016 2000-2017 [employer]. [address] [place] [address], [place] [address]. All rights reserved. This information is not intended as a substitute for professional medical care. Always follow your healthcare professional's instructions.

## 2018-04-11 ENCOUNTER — PRENATAL OFFICE VISIT (OUTPATIENT)
Dept: OBGYN | Facility: CLINIC | Age: 23
End: 2018-04-11
Payer: COMMERCIAL

## 2018-04-11 VITALS — SYSTOLIC BLOOD PRESSURE: 102 MMHG | DIASTOLIC BLOOD PRESSURE: 58 MMHG | BODY MASS INDEX: 26.13 KG/M2 | WEIGHT: 125 LBS

## 2018-04-11 DIAGNOSIS — O34.219 PREVIOUS CESAREAN DELIVERY, ANTEPARTUM: ICD-10-CM

## 2018-04-11 DIAGNOSIS — Z3A.32 32 WEEKS GESTATION OF PREGNANCY: ICD-10-CM

## 2018-04-11 DIAGNOSIS — O09.93 SUPERVISION OF HIGH RISK PREGNANCY IN THIRD TRIMESTER: Primary | ICD-10-CM

## 2018-04-11 PROCEDURE — 99207 ZZC PRENATAL VISIT: CPT | Performed by: OBSTETRICS & GYNECOLOGY

## 2018-04-11 NOTE — MR AVS SNAPSHOT
"              After Visit Summary   2018    Mile Cobos    MRN: [id number]           Patient Information     Date Of Birth          1995        Visit Information        Provider Department      2018 11:20 AM Loreta Rodriguez MD [facility]        Today's Diagnoses     Supervision of high risk pregnancy in third trimester    -  1    Previous  delivery, antepartum        32 weeks gestation of pregnancy           Follow-ups after your visit        Follow-up notes from your care team     Return in about 2 weeks (around 2018) for Routine Prenatal Visit.      Your next 10 appointments already scheduled     May 29, 2018   Procedure with Loreta Rodriguez MD    Birthplace (--)    [address]  [facility] [address] [phone]              Who to contact     If you have questions or need follow up information about today's clinic visit or your schedule please contact [facility] directly at [phone].  Normal or non-critical lab and imaging results will be communicated to you by Todayticketshart, letter or phone within 4 business days after the clinic has received the results. If you do not hear from us within 7 days, please contact the clinic through Todayticketshart or phone. If you have a critical or abnormal lab result, we will notify you by phone as soon as possible.  Submit refill requests through The Start Project or call your pharmacy and they will forward the refill request to us. Please allow 3 business days for your refill to be completed.          Additional Information About Your Visit        MyChart Information     The Start Project lets you send messages to your doctor, view your test results, renew your prescriptions, schedule appointments and more. To sign up, go to [email or web address] . Click on \"Log in\" on the left side of the screen, which will take you to the Welcome page. Then click on \"Sign up Now\" on the right side of the page.     You " will be asked to enter the access code listed below, as well as some personal information. Please follow the directions to create your username and password.     Your access code is: [email or web address]  Expires: 2018 12:27 PM     Your access code will  in 90 days. If you need help or a new code, please call your [place] clinic or [phone].        Care EveryWhere ID     This is your Care EveryWhere ID. This could be used by other organizations to access your [place] medical records  [id number]        Your Vitals Were     Last Period Breastfeeding? BMI (Body Mass Index)             2017 (Exact Date) No 26.13 kg/m2          Blood Pressure from Last 3 Encounters:   18 102/58   18 98/69   18 112/60    Weight from Last 3 Encounters:   18 125 lb (56.7 kg)   18 122 lb (55.3 kg)   18 115 lb (52.2 kg)              Today, you had the following     No orders found for display       Primary Care Provider Office Phone # Fax #    [facility] [place] [facility] [phone]       [facility] [address] [place] [address] [address] [address]        Equal Access to Services     HEMANT PATEL : Hadii aad ku hadasho Soomaali, waaxda luqadaha, qaybta kaalmada adeegyada, waxay nishain hayvalentín bravo . So [facility] [phone].    ATENCIÓN: Si habla español, tiene a monreal disposición servicios gratuitos de asistencia lingüística. Llame al [phone].    We comply with applicable federal civil rights laws and [place] laws. We do not discriminate on the basis of race, color, national origin, age, disability, sex, sexual orientation, or gender identity.            Thank you!     Thank you for choosing [facility] [place]  for your care. Our goal is always to provide you with excellent care. Hearing back from our patients is one way we can continue to improve our services. Please take a few minutes to complete the written  survey that you may receive in the mail after your visit with us. Thank you!             Your Updated Medication List - Protect others around you: Learn how to safely use, store and throw away your medicines at www.disposemymeds.org.          This list is accurate as of 4/11/18 11:50 AM.  Always use your most recent med list.                   Brand Name Dispense Instructions for use Diagnosis    fish oil-omega-3 fatty acids 1000 MG capsule      Take 2 g by mouth daily        IRON SUPPLEMENT PO      Take 27 mg by mouth        prenatal multivitamin plus iron 27-0.8 MG Tabs per tablet      Take 2 tablets by mouth daily

## 2018-04-24 ENCOUNTER — PRENATAL OFFICE VISIT (OUTPATIENT)
Dept: OBGYN | Facility: CLINIC | Age: 23
End: 2018-04-24
Payer: COMMERCIAL

## 2018-04-24 VITALS — DIASTOLIC BLOOD PRESSURE: 60 MMHG | SYSTOLIC BLOOD PRESSURE: 102 MMHG | BODY MASS INDEX: 26.67 KG/M2 | WEIGHT: 127.6 LBS

## 2018-04-24 DIAGNOSIS — Z3A.34 34 WEEKS GESTATION OF PREGNANCY: ICD-10-CM

## 2018-04-24 DIAGNOSIS — O34.219 PREVIOUS CESAREAN DELIVERY, ANTEPARTUM: Primary | ICD-10-CM

## 2018-04-24 DIAGNOSIS — O09.93 SUPERVISION OF HIGH RISK PREGNANCY IN THIRD TRIMESTER: ICD-10-CM

## 2018-04-24 PROCEDURE — 99207 ZZC PRENATAL VISIT: CPT | Performed by: OBSTETRICS & GYNECOLOGY

## 2018-04-24 NOTE — MR AVS SNAPSHOT
After Visit Summary   2018    Mile Cobos    MRN: [id number]           Patient Information     Date Of Birth          1995        Visit Information        Provider Department      2018 11:50 AM Loreta Rodriguez MD [facility] [place]        Today's Diagnoses     Previous  delivery, antepartum    -  1    Supervision of high risk pregnancy in third trimester        34 weeks gestation of pregnancy           Follow-ups after your visit        Follow-up notes from your care team     Return in about 2 weeks (around 2018) for Routine Prenatal Visit.      Your next 10 appointments already scheduled     May 11, 2018  3:40 PM CDT   ESTABLISHED PRENATAL with Loreta Rodriguez MD   [facility] [place] ([facility] [place])    [address]  [address]  [place] [address] [phone]            May 29, 2018   Procedure with Loreta Rodriguez MD    Birthplace (--)    [address]  [place] [address] [phone]              Who to contact     If you have questions or need follow up information about today's clinic visit or your schedule please contact [facility] [place] directly at [phone].  Normal or non-critical lab and imaging results will be communicated to you by MyChart, letter or phone within 4 business days after the clinic has received the results. If you do not hear from us within 7 days, please contact the clinic through "[employer]"hart or phone. If you have a critical or abnormal lab result, we will notify you by phone as soon as possible.  Submit refill requests through NextFit or call your pharmacy and they will forward the refill request to us. Please allow 3 business days for your refill to be completed.          Additional Information About Your Visit        "[employer]hart Information     NextFit lets you send messages to your doctor, view your test results, renew your prescriptions, schedule  "appointments and more. To sign up, go to [email or web address] . Click on \"Log in\" on the left side of the screen, which will take you to the Welcome page. Then click on \"Sign up Now\" on the right side of the page.     You will be asked to enter the access code listed below, as well as some personal information. Please follow the directions to create your username and password.     Your access code is: [email or web address]  Expires: 2018 12:57 PM     Your access code will  in 90 days. If you need help or a new code, please call your [place] clinic or [phone].        Care EveryWhere ID     This is your Care EveryWhere ID. This could be used by other organizations to access your [place] medical records  [id number]        Your Vitals Were     Last Period BMI (Body Mass Index)                2017 (Exact Date) 26.67 kg/m2           Blood Pressure from Last 3 Encounters:   18 102/60   18 102/58   18 98/69    Weight from Last 3 Encounters:   18 127 lb 9.6 oz (57.9 kg)   18 125 lb (56.7 kg)   18 122 lb (55.3 kg)              Today, you had the following     No orders found for display       Primary Care Provider Office Phone # Fax #    [facility] [phone]       [address] [address]        Equal Access to Services     HEMANT PATEL : Hadii estelle padilla hadasho Soomaali, waaxda luqadaha, qaybta kaalmada adedianne, ralph bravo . So [facility] [phone].    ATENCIÓN: Si habla español, tiene a monreal disposición servicios gratuitos de asistencia lingüística. Llame al [phone].    We comply with applicable federal civil rights laws and [place] laws. We do not discriminate on the basis of race, color, national origin, age, disability, sex, sexual orientation, or gender identity.            Thank you!     Thank you for choosing [facility] [place]  for your care. Our goal is always to provide you with excellent care. Hearing back from our patients is one way we can continue to improve our services. Please take a few minutes to complete the written survey that you may receive in the mail after your visit with us. Thank you!             Your Updated Medication List - Protect others around you: Learn how to safely use, store and throw away your medicines at www.disposemymeds.org.          This list is accurate as of 4/24/18 12:57 PM.  Always use your most recent med list.                   Brand Name Dispense Instructions for use Diagnosis    fish oil-omega-3 fatty acids 1000 MG capsule      Take 2 g by mouth daily        IRON SUPPLEMENT PO      Take 27 mg by mouth        prenatal multivitamin plus iron 27-0.8 MG Tabs per tablet      Take 2 tablets by mouth daily

## 2018-04-24 NOTE — PROGRESS NOTES
Doing well today.  +FM  No ctx/cramping/vb/lof  Compression stockings helping a ton with swelling and foot pain  RTC 2wks

## 2018-05-04 ENCOUNTER — TELEPHONE (OUTPATIENT)
Dept: NURSING | Facility: CLINIC | Age: 23
End: 2018-05-04

## 2018-05-04 NOTE — LETTER
[facility] [place]  [address]  [facility] [address]  [phone]      May 4, 2018      Regarding: [place] Cobos  YOB: 1995  [address]   [facility] [address]    To Whom It May Concern:     Routine teeth cleaning, use of Novocaine and dental x-rays are safe during all stages of pregnancy. Antibiotics that are category B and narcotic pain medications can also be safely used during all stages of pregnancy. Examples of these include, but are not limited to: amoxicillin, penicillin, Vicodin, Darvocet, Tylenol #3 and Percocet. Root canals are safe during all trimesters of pregnancy.    Please use these guidelines when treating our patients. If you have additional questions or concerns, please call us at [phone].    Sincerely,        Dr. Kevyn Hayes MD

## 2018-05-04 NOTE — TELEPHONE ENCOUNTER
Pt is requesting letter be sent to her dentist so that she can have her teeth cleaned. Pt wants letter sent to [facility]. Fax number [phone]. Letter written. Signed by Dr. Hayes. Faxed to number given

## 2018-05-11 ENCOUNTER — PRENATAL OFFICE VISIT (OUTPATIENT)
Dept: OBGYN | Facility: CLINIC | Age: 23
End: 2018-05-11
Payer: COMMERCIAL

## 2018-05-11 VITALS — DIASTOLIC BLOOD PRESSURE: 56 MMHG | WEIGHT: 134 LBS | BODY MASS INDEX: 28.01 KG/M2 | SYSTOLIC BLOOD PRESSURE: 114 MMHG

## 2018-05-11 DIAGNOSIS — O09.93 SUPERVISION OF HIGH RISK PREGNANCY IN THIRD TRIMESTER: Primary | ICD-10-CM

## 2018-05-11 DIAGNOSIS — Z3A.36 36 WEEKS GESTATION OF PREGNANCY: ICD-10-CM

## 2018-05-11 DIAGNOSIS — O34.219 PREVIOUS CESAREAN DELIVERY, ANTEPARTUM: ICD-10-CM

## 2018-05-11 PROCEDURE — 87653 STREP B DNA AMP PROBE: CPT | Performed by: OBSTETRICS & GYNECOLOGY

## 2018-05-11 PROCEDURE — 99207 ZZC PRENATAL VISIT: CPT | Performed by: OBSTETRICS & GYNECOLOGY

## 2018-05-11 NOTE — PROGRESS NOTES
Doing well today.  +FM  No ctx/cramping/vb/lof  Labor precautions reviewed  GBS collected  RTC 1wk

## 2018-05-11 NOTE — MR AVS SNAPSHOT
"              After Visit Summary   2018    Mile Cobos    MRN: [id number]           Patient Information     Date Of Birth          1995        Visit Information        Provider Department      2018 3:40 PM Loreta Rodriguez MD [facility]        Today's Diagnoses     Supervision of high risk pregnancy in third trimester    -  1    Previous  delivery, antepartum        36 weeks gestation of pregnancy           Follow-ups after your visit        Follow-up notes from your care team     Return in about 1 week (around 2018) for Routine Prenatal Visit.      Your next 10 appointments already scheduled     May 29, 2018   Procedure with Loreta Rodriguez MD    Birthplace (--)    [address]  [facility] [address] [phone]              Who to contact     If you have questions or need follow up information about today's clinic visit or your schedule please contact [facility] directly at [phone].  Normal or non-critical lab and imaging results will be communicated to you by Breeze Techhart, letter or phone within 4 business days after the clinic has received the results. If you do not hear from us within 7 days, please contact the clinic through B[employer] or phone. If you have a critical or abnormal lab result, we will notify you by phone as soon as possible.  Submit refill requests through ProTip or call your pharmacy and they will forward the refill request to us. Please allow 3 business days for your refill to be completed.          Additional Information About Your Visit        MyChart Information     ProTip lets you send messages to your doctor, view your test results, renew your prescriptions, schedule appointments and more. To sign up, go to [email or web address] . Click on \"Log in\" on the left side of the screen, which will take you to the Welcome page. Then click on \"Sign up Now\" on the right side of the page.     You " will be asked to enter the access code listed below, as well as some personal information. Please follow the directions to create your username and password.     Your access code is: [email or web address]  Expires: 2018 12:57 PM     Your access code will  in 90 days. If you need help or a new code, please call your [place] clinic or [phone].        Care EveryWhere ID     This is your Care EveryWhere ID. This could be used by other organizations to access your [place] medical records  [id number]        Your Vitals Were     Last Period Breastfeeding? BMI (Body Mass Index)             2017 (Exact Date) No 28.01 kg/m2          Blood Pressure from Last 3 Encounters:   18 114/56   18 102/60   18 102/58    Weight from Last 3 Encounters:   18 134 lb (60.8 kg)   18 127 lb 9.6 oz (57.9 kg)   18 125 lb (56.7 kg)              We Performed the Following     Group B strep PCR        Primary Care Provider Office Phone # Fax #    [facility] [facility] [phone]       [address] [address]        Equal Access to Services     HEMANT PATEL : Hadii aad ku hadasho Soomaali, waaxda luqadaha, qaybta kaalmada adeegyada, ralph bravo . So [facility] [phone].    ATENCIÓN: Si habla español, tiene a monreal disposición servicios gratuitos de asistencia lingüística. Llame al [phone].    We comply with applicable federal civil rights laws and [place] laws. We do not discriminate on the basis of race, color, national origin, age, disability, sex, sexual orientation, or gender identity.            Thank you!     Thank you for choosing [facility] [place]  for your care. Our goal is always to provide you with excellent care. Hearing back from our patients is one way we can continue to improve our services. Please take a few minutes to complete the written survey  that you may receive in the mail after your visit with us. Thank you!             Your Updated Medication List - Protect others around you: Learn how to safely use, store and throw away your medicines at www.disposemymeds.org.          This list is accurate as of 5/11/18  4:19 PM.  Always use your most recent med list.                   Brand Name Dispense Instructions for use Diagnosis    fish oil-omega-3 fatty acids 1000 MG capsule      Take 2 g by mouth daily        IRON SUPPLEMENT PO      Take 27 mg by mouth        prenatal multivitamin plus iron 27-0.8 MG Tabs per tablet      Take 2 tablets by mouth daily

## 2018-05-12 LAB
GP B STREP DNA SPEC QL NAA+PROBE: NEGATIVE
SPECIMEN SOURCE: NORMAL

## 2018-05-15 ENCOUNTER — PRENATAL OFFICE VISIT (OUTPATIENT)
Dept: OBGYN | Facility: CLINIC | Age: 23
End: 2018-05-15
Payer: COMMERCIAL

## 2018-05-15 VITALS — DIASTOLIC BLOOD PRESSURE: 78 MMHG | SYSTOLIC BLOOD PRESSURE: 120 MMHG | WEIGHT: 137 LBS | BODY MASS INDEX: 28.63 KG/M2

## 2018-05-15 DIAGNOSIS — O09.93 SUPERVISION OF HIGH RISK PREGNANCY IN THIRD TRIMESTER: ICD-10-CM

## 2018-05-15 DIAGNOSIS — O34.219 PREVIOUS CESAREAN DELIVERY, ANTEPARTUM: Primary | ICD-10-CM

## 2018-05-15 DIAGNOSIS — Z3A.37 37 WEEKS GESTATION OF PREGNANCY: ICD-10-CM

## 2018-05-15 PROCEDURE — 99207 ZZC PRENATAL VISIT: CPT | Performed by: OBSTETRICS & GYNECOLOGY

## 2018-05-15 NOTE — MR AVS SNAPSHOT
"              After Visit Summary   5/15/2018    Mile Cobos    MRN: [id number]           Patient Information     Date Of Birth          1995        Visit Information        Provider Department      5/15/2018 1:40 PM Loreta Rodriguez MD [facility] [place]        Today's Diagnoses     Previous  delivery, antepartum    -  1    Supervision of high risk pregnancy in third trimester        37 weeks gestation of pregnancy           Follow-ups after your visit        Follow-up notes from your care team     Return in about 1 week (around 2018) for Routine Prenatal Visit.      Your next 10 appointments already scheduled     May 29, 2018   Procedure with Loreta Rodriguez MD    Birthplace (--)    [address]  [facility] [address] [phone]              Who to contact     If you have questions or need follow up information about today's clinic visit or your schedule please contact [facility] [place] directly at [phone].  Normal or non-critical lab and imaging results will be communicated to you by [employer] Plushart, letter or phone within 4 business days after the clinic has received the results. If you do not hear from us within 7 days, please contact the clinic through [employer] Plushart or phone. If you have a critical or abnormal lab result, we will notify you by phone as soon as possible.  Submit refill requests through [facility] or call your pharmacy and they will forward the refill request to us. Please allow 3 business days for your refill to be completed.          Additional Information About Your Visit        MyChart Information     [facility] lets you send messages to your doctor, view your test results, renew your prescriptions, schedule appointments and more. To sign up, go to [email or web address] . Click on \"Log in\" on the left side of the screen, which will take you to the Welcome page. Then click on \"Sign up Now\" on the right side of the page.     You " will be asked to enter the access code listed below, as well as some personal information. Please follow the directions to create your username and password.     Your access code is: [email or web address]  Expires: 2018 12:57 PM     Your access code will  in 90 days. If you need help or a new code, please call your [place] clinic or [phone].        Care EveryWhere ID     This is your Care EveryWhere ID. This could be used by other organizations to access your [place] medical records  [id number]        Your Vitals Were     Last Period Breastfeeding? BMI (Body Mass Index)             2017 (Exact Date) No 28.63 kg/m2          Blood Pressure from Last 3 Encounters:   05/15/18 120/78   18 114/56   18 102/60    Weight from Last 3 Encounters:   05/15/18 137 lb (62.1 kg)   18 134 lb (60.8 kg)   18 127 lb 9.6 oz (57.9 kg)              Today, you had the following     No orders found for display       Primary Care Provider Office Phone # Fax #    [facility] [place] [facility] [phone]       [facility] [address]  [address] [address]        Equal Access to Services     HEMANT PATEL : Hadii aad ku hadasho Soomaali, waaxda luqadaha, qaybta kaalmada adeegyada, ralph cameronin haynahomyn lan bravo . So [facility] [place] [facility] [phone].    ATENCIÓN: Si habla español, tiene a monreal disposición servicios gratuitos de asistencia lingüística. Llame al [phone].    We comply with applicable federal civil rights laws and [place] laws. We do not discriminate on the basis of race, color, national origin, age, disability, sex, sexual orientation, or gender identity.            Thank you!     Thank you for choosing [facility] [place]  for your care. Our goal is always to provide you with excellent care. Hearing back from our patients is one way we can continue to improve our services. Please take a few minutes to complete the  written survey that you may receive in the mail after your visit with us. Thank you!             Your Updated Medication List - Protect others around you: Learn how to safely use, store and throw away your medicines at www.disposemymeds.org.          This list is accurate as of 5/15/18  2:06 PM.  Always use your most recent med list.                   Brand Name Dispense Instructions for use Diagnosis    fish oil-omega-3 fatty acids 1000 MG capsule      Take 2 g by mouth daily        IRON SUPPLEMENT PO      Take 27 mg by mouth        prenatal multivitamin plus iron 27-0.8 MG Tabs per tablet      Take 2 tablets by mouth daily

## 2018-05-15 NOTE — PROGRESS NOTES
Doing well today.  +FM  +ctx --a few every hour; no vb/spotting/lof  No bowel/bladder issues  Labor precautions reviewed  RTC 1wk

## 2018-05-22 ENCOUNTER — PRENATAL OFFICE VISIT (OUTPATIENT)
Dept: OBGYN | Facility: CLINIC | Age: 23
End: 2018-05-22
Payer: COMMERCIAL

## 2018-05-22 VITALS — WEIGHT: 139 LBS | SYSTOLIC BLOOD PRESSURE: 104 MMHG | BODY MASS INDEX: 29.05 KG/M2 | DIASTOLIC BLOOD PRESSURE: 70 MMHG

## 2018-05-22 DIAGNOSIS — Z3A.38 38 WEEKS GESTATION OF PREGNANCY: Primary | ICD-10-CM

## 2018-05-22 DIAGNOSIS — O34.219 PREVIOUS CESAREAN DELIVERY, ANTEPARTUM: ICD-10-CM

## 2018-05-22 DIAGNOSIS — O09.93 SUPERVISION OF HIGH RISK PREGNANCY IN THIRD TRIMESTER: ICD-10-CM

## 2018-05-22 PROCEDURE — 99207 ZZC PRENATAL VISIT: CPT | Performed by: OBSTETRICS & GYNECOLOGY

## 2018-05-22 PROCEDURE — 59426 ANTEPARTUM CARE ONLY: CPT | Performed by: OBSTETRICS & GYNECOLOGY

## 2018-05-22 NOTE — PROGRESS NOTES
Doing well today.  +FM  +ctx on Friday --left work early; no vb/spotting; occ mucus discharge  Labor precautions reviewed  Discussed schedule for next Tues c/s --all questions answered

## 2018-05-22 NOTE — MR AVS SNAPSHOT
"              After Visit Summary   2018    Mile Cobos    MRN: [id number]           Patient Information     Date Of Birth          1995        Visit Information        Provider Department      2018 11:30 AM Loreta Rodriguez MD [facility]        Today's Diagnoses     38 weeks gestation of pregnancy    -  1    Supervision of high risk pregnancy in third trimester        Previous  delivery, antepartum           Follow-ups after your visit        Follow-up notes from your care team     Return if symptoms worsen or fail to improve.      Your next 10 appointments already scheduled     May 29, 2018   Procedure with Loreta Rodriguez MD    Birthplace (--)    [address], [address]  [facility] [address] [phone]              Who to contact     If you have questions or need follow up information about today's clinic visit or your schedule please contact [facility] directly at [phone].  Normal or non-critical lab and imaging results will be communicated to you by MyChart, letter or phone within 4 business days after the clinic has received the results. If you do not hear from us within 7 days, please contact the clinic through [employer] or phone. If you have a critical or abnormal lab result, we will notify you by phone as soon as possible.  Submit refill requests through [employer] or call your pharmacy and they will forward the refill request to us. Please allow 3 business days for your refill to be completed.          Additional Information About Your Visit        [employer] Information     [employer] lets you send messages to your doctor, view your test results, renew your prescriptions, schedule appointments and more. To sign up, go to [email or web address] . Click on \"Log in\" on the left side of the screen, which will take you to the Welcome page. Then click on \"Sign up Now\" on the right side of the page.     You will be asked to enter the " access code listed below, as well as some personal information. Please follow the directions to create your username and password.     Your access code is: [email or web address]  Expires: 2018 12:57 PM     Your access code will  in 90 days. If you need help or a new code, please call your [place] clinic or [phone].        Care EveryWhere ID     This is your Care EveryWhere ID. This could be used by other organizations to access your [place] medical records  [id number]        Your Vitals Were     Last Period BMI (Body Mass Index)                2017 (Exact Date) 29.05 kg/m2           Blood Pressure from Last 3 Encounters:   18 104/70   05/15/18 120/78   18 114/56    Weight from Last 3 Encounters:   18 139 lb (63 kg)   05/15/18 137 lb (62.1 kg)   18 134 lb (60.8 kg)              Today, you had the following     No orders found for display       Primary Care Provider Office Phone # Fax #    [facility] [phone]       [address]  [facility] [address]        Equal Access to Services     HEMANT PATEL : Hadii aad ku hadasho Soomaali, waaxda luqadaha, qaybta kaalmada adeegyada, waxay vinita bravo . So [facility] [phone].    ATENCIÓN: Si habla español, tiene a monreal disposición servicios gratuitos de asistencia lingüística. Llame al [phone].    We comply with applicable federal civil rights laws and [place] laws. We do not discriminate on the basis of race, color, national origin, age, disability, sex, sexual orientation, or gender identity.            Thank you!     Thank you for choosing [facility]  for your care. Our goal is always to provide you with excellent care. Hearing back from our patients is one way we can continue to improve our services. Please take a few minutes to complete the written survey that you may receive in the mail after  your visit with us. Thank you!             Your Updated Medication List - Protect others around you: Learn how to safely use, store and throw away your medicines at www.disposemymeds.org.          This list is accurate as of 5/22/18 12:09 PM.  Always use your most recent med list.                   Brand Name Dispense Instructions for use Diagnosis    fish oil-omega-3 fatty acids 1000 MG capsule      Take 2 g by mouth daily        IRON SUPPLEMENT PO      Take 27 mg by mouth        prenatal multivitamin plus iron 27-0.8 MG Tabs per tablet      Take 2 tablets by mouth daily

## 2018-05-28 NOTE — H&P
[facility]    History and Physical  Obstetrics and Gynecology     Date of Admission:  (Not on file)    Assessment & Plan   Mile Cobos is a 22 year old female who presents for Repeat  section.     ASSESSMENT:   IUP @ 38w6d  Prior c/s for CPD and chorioamnionitis --desires repeat      PLAN:   Scheduled for  section.  No questions.      Loreta Rodriguez    History of Present Illness   Mile Cobos is a 22 year old female  38w6d  Estimated Date of Delivery: 2018 is calculated from Patient's last menstrual period was 2017 (exact date). is admitted to the [facility] for Repeat  section.  Uncomplicated pregnancy.    PRENATAL COURSE  Prenatal course was essentially uncomplicated      Recent Labs   Lab Test  18   1402  10/25/17   1406   ABO   --   B   RH   --   Neg   AS  Neg  Neg     Rhogam indicated and given on 3/6   Recent Labs   Lab Test  18   1610  10/25/17   1407   HEPBANG   --   Nonreactive   HIAGAB   --   Nonreactive   GBS  Negative   --    RUQIGG   --   18         Prior to Admission Medications   Cannot display prior to admission medications because the patient has not been admitted in this contact.     Allergies   No Known Allergies      Immunization History   Immunization History   Administered Date(s) Administered     Influenza Vaccine IM 3yrs+ 4 Valent IIV4 10/25/2017     Rhogam 2018     TDAP Vaccine (Adacel) 2018       Past Medical History:   Diagnosis Date     Abnormal Pap smear of cervix 2015    AS-CUS, HPV Negative     Anxiety     Has never been on medications for it, stated it started postpartum.     History of chlamydia     In high school, was treated and had a treatment of cure.     History of gonorrhea     In high school, was treated and had a treatment of cure.     IUD contraception 2015    Mirena removed 17 by Dr. Arnold.       Past Surgical History:   Procedure Laterality Date       SECTION N/A 2015    Procedure:  SECTION;  Surgeon: Malena Arnold MD;  Location: [facility]       Clinic vitals from today:  /70     Abdomen: gravid, single vertex fetus, non-tender, EFW 7 lbs 8oz-8lbs    Constitutional: healthy, alert, active and no distress   Extremities: NT, no edema  Neurologic: Awake, alert, oriented x3  Neuropsychiatric: General: normal, calm and normal eye contact  Heart: Regular rate and rhythm  Lungs: clear to ausculation bilaterally    Loreta Rodriguez MD

## 2018-05-29 ENCOUNTER — HOSPITAL ENCOUNTER (INPATIENT)
Facility: CLINIC | Age: 23
LOS: 3 days | Discharge: HOME HEALTH | End: 2018-06-01
Attending: OBSTETRICS & GYNECOLOGY | Admitting: OBSTETRICS & GYNECOLOGY
Payer: COMMERCIAL

## 2018-05-29 ENCOUNTER — ANESTHESIA EVENT (OUTPATIENT)
Dept: OBGYN | Facility: CLINIC | Age: 23
End: 2018-05-29
Payer: COMMERCIAL

## 2018-05-29 ENCOUNTER — ANESTHESIA (OUTPATIENT)
Dept: OBGYN | Facility: CLINIC | Age: 23
End: 2018-05-29
Payer: COMMERCIAL

## 2018-05-29 DIAGNOSIS — Z98.891 STATUS POST CESAREAN DELIVERY: Primary | ICD-10-CM

## 2018-05-29 LAB
ABO + RH BLD: NORMAL
ABO + RH BLD: NORMAL
BLD GP AB SCN SERPL QL: NORMAL
BLOOD BANK CMNT PATIENT-IMP: NORMAL
BLOOD BANK CMNT PATIENT-IMP: NORMAL
ERYTHROCYTE [DISTWIDTH] IN BLOOD BY AUTOMATED COUNT: 14.1 % (ref 10–15)
HCT VFR BLD AUTO: 31.5 % (ref 35–47)
HGB BLD-MCNC: 10.4 G/DL (ref 11.7–15.7)
MCH RBC QN AUTO: 27.4 PG (ref 26.5–33)
MCHC RBC AUTO-ENTMCNC: 33 G/DL (ref 31.5–36.5)
MCV RBC AUTO: 83 FL (ref 78–100)
PLATELET # BLD AUTO: 203 10E9/L (ref 150–450)
RBC # BLD AUTO: 3.8 10E12/L (ref 3.8–5.2)
SPECIMEN EXP DATE BLD: NORMAL
T PALLIDUM AB SER QL: NONREACTIVE
WBC # BLD AUTO: 6.3 10E9/L (ref 4–11)

## 2018-05-29 PROCEDURE — 36000056 ZZH SURGERY LEVEL 3 1ST 30 MIN: Performed by: OBSTETRICS & GYNECOLOGY

## 2018-05-29 PROCEDURE — 25000128 H RX IP 250 OP 636: Performed by: OBSTETRICS & GYNECOLOGY

## 2018-05-29 PROCEDURE — 86901 BLOOD TYPING SEROLOGIC RH(D): CPT | Performed by: OBSTETRICS & GYNECOLOGY

## 2018-05-29 PROCEDURE — 25000132 ZZH RX MED GY IP 250 OP 250 PS 637

## 2018-05-29 PROCEDURE — 25000132 ZZH RX MED GY IP 250 OP 250 PS 637: Performed by: OBSTETRICS & GYNECOLOGY

## 2018-05-29 PROCEDURE — 25000128 H RX IP 250 OP 636: Performed by: STUDENT IN AN ORGANIZED HEALTH CARE EDUCATION/TRAINING PROGRAM

## 2018-05-29 PROCEDURE — 86850 RBC ANTIBODY SCREEN: CPT | Performed by: OBSTETRICS & GYNECOLOGY

## 2018-05-29 PROCEDURE — 36415 COLL VENOUS BLD VENIPUNCTURE: CPT | Performed by: OBSTETRICS & GYNECOLOGY

## 2018-05-29 PROCEDURE — 71000012 ZZH RECOVERY PHASE 1 LEVEL 1 FIRST HR: Performed by: OBSTETRICS & GYNECOLOGY

## 2018-05-29 PROCEDURE — 25000125 ZZHC RX 250: Performed by: STUDENT IN AN ORGANIZED HEALTH CARE EDUCATION/TRAINING PROGRAM

## 2018-05-29 PROCEDURE — 37000009 ZZH ANESTHESIA TECHNICAL FEE, EACH ADDTL 15 MIN: Performed by: OBSTETRICS & GYNECOLOGY

## 2018-05-29 PROCEDURE — 71000013 ZZH RECOVERY PHASE 1 LEVEL 1 EA ADDTL HR: Performed by: OBSTETRICS & GYNECOLOGY

## 2018-05-29 PROCEDURE — 12000037 ZZH R&B POSTPARTUM INTERMEDIATE

## 2018-05-29 PROCEDURE — 86780 TREPONEMA PALLIDUM: CPT | Performed by: OBSTETRICS & GYNECOLOGY

## 2018-05-29 PROCEDURE — 59515 CESAREAN DELIVERY: CPT | Performed by: OBSTETRICS & GYNECOLOGY

## 2018-05-29 PROCEDURE — 25000128 H RX IP 250 OP 636

## 2018-05-29 PROCEDURE — 27210794 ZZH OR GENERAL SUPPLY STERILE: Performed by: OBSTETRICS & GYNECOLOGY

## 2018-05-29 PROCEDURE — 36000058 ZZH SURGERY LEVEL 3 EA 15 ADDTL MIN: Performed by: OBSTETRICS & GYNECOLOGY

## 2018-05-29 PROCEDURE — 59514 CESAREAN DELIVERY ONLY: CPT | Mod: AS | Performed by: ADVANCED PRACTICE MIDWIFE

## 2018-05-29 PROCEDURE — 25000125 ZZHC RX 250: Performed by: OBSTETRICS & GYNECOLOGY

## 2018-05-29 PROCEDURE — 37000008 ZZH ANESTHESIA TECHNICAL FEE, 1ST 30 MIN: Performed by: OBSTETRICS & GYNECOLOGY

## 2018-05-29 PROCEDURE — 85027 COMPLETE CBC AUTOMATED: CPT | Performed by: OBSTETRICS & GYNECOLOGY

## 2018-05-29 PROCEDURE — 86900 BLOOD TYPING SEROLOGIC ABO: CPT | Performed by: OBSTETRICS & GYNECOLOGY

## 2018-05-29 RX ORDER — MORPHINE SULFATE 1 MG/ML
INJECTION, SOLUTION EPIDURAL; INTRATHECAL; INTRAVENOUS
Status: DISCONTINUED
Start: 2018-05-29 | End: 2018-05-29 | Stop reason: HOSPADM

## 2018-05-29 RX ORDER — LIDOCAINE 40 MG/G
CREAM TOPICAL
Status: DISCONTINUED | OUTPATIENT
Start: 2018-05-29 | End: 2018-05-30

## 2018-05-29 RX ORDER — OXYCODONE HYDROCHLORIDE 5 MG/1
5-10 TABLET ORAL
Status: DISCONTINUED | OUTPATIENT
Start: 2018-05-29 | End: 2018-06-01 | Stop reason: HOSPADM

## 2018-05-29 RX ORDER — DEXTROSE, SODIUM CHLORIDE, SODIUM LACTATE, POTASSIUM CHLORIDE, AND CALCIUM CHLORIDE 5; .6; .31; .03; .02 G/100ML; G/100ML; G/100ML; G/100ML; G/100ML
INJECTION, SOLUTION INTRAVENOUS CONTINUOUS
Status: DISCONTINUED | OUTPATIENT
Start: 2018-05-29 | End: 2018-06-01 | Stop reason: HOSPADM

## 2018-05-29 RX ORDER — ACETAMINOPHEN 325 MG/1
TABLET ORAL
Status: COMPLETED
Start: 2018-05-29 | End: 2018-05-29

## 2018-05-29 RX ORDER — NALOXONE HYDROCHLORIDE 0.4 MG/ML
.1-.4 INJECTION, SOLUTION INTRAMUSCULAR; INTRAVENOUS; SUBCUTANEOUS
Status: ACTIVE | OUTPATIENT
Start: 2018-05-29 | End: 2018-05-30

## 2018-05-29 RX ORDER — NALBUPHINE HYDROCHLORIDE 10 MG/ML
2.5-5 INJECTION, SOLUTION INTRAMUSCULAR; INTRAVENOUS; SUBCUTANEOUS EVERY 6 HOURS PRN
Status: DISCONTINUED | OUTPATIENT
Start: 2018-05-29 | End: 2018-05-30

## 2018-05-29 RX ORDER — HYDROCORTISONE 2.5 %
CREAM (GRAM) TOPICAL 3 TIMES DAILY PRN
Status: DISCONTINUED | OUTPATIENT
Start: 2018-05-29 | End: 2018-06-01 | Stop reason: HOSPADM

## 2018-05-29 RX ORDER — CITRIC ACID/SODIUM CITRATE 334-500MG
SOLUTION, ORAL ORAL
Status: COMPLETED
Start: 2018-05-29 | End: 2018-05-29

## 2018-05-29 RX ORDER — IBUPROFEN 600 MG/1
600 TABLET, FILM COATED ORAL EVERY 6 HOURS PRN
Status: DISCONTINUED | OUTPATIENT
Start: 2018-05-30 | End: 2018-06-01 | Stop reason: HOSPADM

## 2018-05-29 RX ORDER — DEXAMETHASONE SODIUM PHOSPHATE 4 MG/ML
INJECTION, SOLUTION INTRA-ARTICULAR; INTRALESIONAL; INTRAMUSCULAR; INTRAVENOUS; SOFT TISSUE
Status: DISCONTINUED
Start: 2018-05-29 | End: 2018-05-29 | Stop reason: HOSPADM

## 2018-05-29 RX ORDER — ONDANSETRON 2 MG/ML
INJECTION INTRAMUSCULAR; INTRAVENOUS
Status: DISCONTINUED
Start: 2018-05-29 | End: 2018-05-29 | Stop reason: HOSPADM

## 2018-05-29 RX ORDER — ONDANSETRON 2 MG/ML
4 INJECTION INTRAMUSCULAR; INTRAVENOUS EVERY 30 MIN PRN
Status: DISCONTINUED | OUTPATIENT
Start: 2018-05-29 | End: 2018-05-29 | Stop reason: HOSPADM

## 2018-05-29 RX ORDER — OXYTOCIN/0.9 % SODIUM CHLORIDE 30/500 ML
340 PLASTIC BAG, INJECTION (ML) INTRAVENOUS CONTINUOUS PRN
Status: DISCONTINUED | OUTPATIENT
Start: 2018-05-29 | End: 2018-06-01 | Stop reason: HOSPADM

## 2018-05-29 RX ORDER — ONDANSETRON 2 MG/ML
4 INJECTION INTRAMUSCULAR; INTRAVENOUS EVERY 6 HOURS PRN
Status: DISCONTINUED | OUTPATIENT
Start: 2018-05-29 | End: 2018-05-30 | Stop reason: DRUGHIGH

## 2018-05-29 RX ORDER — OXYTOCIN 10 [USP'U]/ML
INJECTION, SOLUTION INTRAMUSCULAR; INTRAVENOUS
Status: DISCONTINUED
Start: 2018-05-29 | End: 2018-05-29 | Stop reason: WASHOUT

## 2018-05-29 RX ORDER — SIMETHICONE 80 MG
80 TABLET,CHEWABLE ORAL 4 TIMES DAILY PRN
Status: DISCONTINUED | OUTPATIENT
Start: 2018-05-29 | End: 2018-06-01 | Stop reason: HOSPADM

## 2018-05-29 RX ORDER — MEPERIDINE HYDROCHLORIDE 25 MG/ML
12.5 INJECTION INTRAMUSCULAR; INTRAVENOUS; SUBCUTANEOUS EVERY 5 MIN PRN
Status: DISCONTINUED | OUTPATIENT
Start: 2018-05-29 | End: 2018-05-29 | Stop reason: HOSPADM

## 2018-05-29 RX ORDER — IBUPROFEN 400 MG/1
800 TABLET, FILM COATED ORAL EVERY 6 HOURS PRN
Status: DISCONTINUED | OUTPATIENT
Start: 2018-05-30 | End: 2018-06-01 | Stop reason: HOSPADM

## 2018-05-29 RX ORDER — EPHEDRINE SULFATE 50 MG/ML
INJECTION, SOLUTION INTRAMUSCULAR; INTRAVENOUS; SUBCUTANEOUS PRN
Status: DISCONTINUED | OUTPATIENT
Start: 2018-05-29 | End: 2018-05-29

## 2018-05-29 RX ORDER — MISOPROSTOL 200 UG/1
400 TABLET ORAL
Status: DISCONTINUED | OUTPATIENT
Start: 2018-05-29 | End: 2018-06-01 | Stop reason: HOSPADM

## 2018-05-29 RX ORDER — CEFAZOLIN SODIUM 2 G/100ML
INJECTION, SOLUTION INTRAVENOUS
Status: DISCONTINUED
Start: 2018-05-29 | End: 2018-05-29 | Stop reason: HOSPADM

## 2018-05-29 RX ORDER — ONDANSETRON 4 MG/1
4 TABLET, ORALLY DISINTEGRATING ORAL EVERY 30 MIN PRN
Status: DISCONTINUED | OUTPATIENT
Start: 2018-05-29 | End: 2018-05-29 | Stop reason: HOSPADM

## 2018-05-29 RX ORDER — NALOXONE HYDROCHLORIDE 0.4 MG/ML
.1-.4 INJECTION, SOLUTION INTRAMUSCULAR; INTRAVENOUS; SUBCUTANEOUS
Status: DISCONTINUED | OUTPATIENT
Start: 2018-05-29 | End: 2018-06-01 | Stop reason: HOSPADM

## 2018-05-29 RX ORDER — LIDOCAINE 40 MG/G
CREAM TOPICAL
Status: DISCONTINUED | OUTPATIENT
Start: 2018-05-29 | End: 2018-06-01 | Stop reason: HOSPADM

## 2018-05-29 RX ORDER — OXYTOCIN 10 [USP'U]/ML
10 INJECTION, SOLUTION INTRAMUSCULAR; INTRAVENOUS
Status: DISCONTINUED | OUTPATIENT
Start: 2018-05-29 | End: 2018-06-01 | Stop reason: HOSPADM

## 2018-05-29 RX ORDER — IBUPROFEN 400 MG/1
400 TABLET, FILM COATED ORAL EVERY 6 HOURS PRN
Status: DISCONTINUED | OUTPATIENT
Start: 2018-05-30 | End: 2018-06-01 | Stop reason: HOSPADM

## 2018-05-29 RX ORDER — CITRIC ACID/SODIUM CITRATE 334-500MG
30 SOLUTION, ORAL ORAL
Status: COMPLETED | OUTPATIENT
Start: 2018-05-29 | End: 2018-05-29

## 2018-05-29 RX ORDER — MORPHINE SULFATE 1 MG/ML
INJECTION, SOLUTION EPIDURAL; INTRATHECAL; INTRAVENOUS PRN
Status: DISCONTINUED | OUTPATIENT
Start: 2018-05-29 | End: 2018-05-29

## 2018-05-29 RX ORDER — CEFAZOLIN SODIUM 2 G/100ML
2 INJECTION, SOLUTION INTRAVENOUS
Status: COMPLETED | OUTPATIENT
Start: 2018-05-29 | End: 2018-05-29

## 2018-05-29 RX ORDER — ACETAMINOPHEN 325 MG/1
975 TABLET ORAL ONCE
Status: COMPLETED | OUTPATIENT
Start: 2018-05-29 | End: 2018-05-29

## 2018-05-29 RX ORDER — HYDROMORPHONE HYDROCHLORIDE 1 MG/ML
.3-.5 INJECTION, SOLUTION INTRAMUSCULAR; INTRAVENOUS; SUBCUTANEOUS EVERY 30 MIN PRN
Status: DISCONTINUED | OUTPATIENT
Start: 2018-05-29 | End: 2018-06-01 | Stop reason: HOSPADM

## 2018-05-29 RX ORDER — KETOROLAC TROMETHAMINE 30 MG/ML
30 INJECTION, SOLUTION INTRAMUSCULAR; INTRAVENOUS EVERY 6 HOURS
Status: COMPLETED | OUTPATIENT
Start: 2018-05-29 | End: 2018-05-30

## 2018-05-29 RX ORDER — NALOXONE HYDROCHLORIDE 0.4 MG/ML
.1-.4 INJECTION, SOLUTION INTRAMUSCULAR; INTRAVENOUS; SUBCUTANEOUS
Status: DISCONTINUED | OUTPATIENT
Start: 2018-05-29 | End: 2018-05-30

## 2018-05-29 RX ORDER — AMOXICILLIN 250 MG
1 CAPSULE ORAL 2 TIMES DAILY PRN
Status: DISCONTINUED | OUTPATIENT
Start: 2018-05-29 | End: 2018-06-01 | Stop reason: HOSPADM

## 2018-05-29 RX ORDER — KETOROLAC TROMETHAMINE 30 MG/ML
30 INJECTION, SOLUTION INTRAMUSCULAR; INTRAVENOUS
Status: DISCONTINUED | OUTPATIENT
Start: 2018-05-29 | End: 2018-05-29 | Stop reason: HOSPADM

## 2018-05-29 RX ORDER — HYDROMORPHONE HYDROCHLORIDE 1 MG/ML
.3-.5 INJECTION, SOLUTION INTRAMUSCULAR; INTRAVENOUS; SUBCUTANEOUS EVERY 5 MIN PRN
Status: DISCONTINUED | OUTPATIENT
Start: 2018-05-29 | End: 2018-05-29 | Stop reason: HOSPADM

## 2018-05-29 RX ORDER — OXYTOCIN/0.9 % SODIUM CHLORIDE 30/500 ML
PLASTIC BAG, INJECTION (ML) INTRAVENOUS
Status: DISCONTINUED
Start: 2018-05-29 | End: 2018-05-29 | Stop reason: HOSPADM

## 2018-05-29 RX ORDER — AMOXICILLIN 250 MG
2 CAPSULE ORAL 2 TIMES DAILY PRN
Status: DISCONTINUED | OUTPATIENT
Start: 2018-05-29 | End: 2018-06-01 | Stop reason: HOSPADM

## 2018-05-29 RX ORDER — OXYTOCIN/0.9 % SODIUM CHLORIDE 30/500 ML
100 PLASTIC BAG, INJECTION (ML) INTRAVENOUS CONTINUOUS
Status: DISCONTINUED | OUTPATIENT
Start: 2018-05-29 | End: 2018-06-01 | Stop reason: HOSPADM

## 2018-05-29 RX ORDER — DIPHENHYDRAMINE HCL 25 MG
25 CAPSULE ORAL EVERY 6 HOURS PRN
Status: DISCONTINUED | OUTPATIENT
Start: 2018-05-29 | End: 2018-06-01 | Stop reason: HOSPADM

## 2018-05-29 RX ORDER — KETOROLAC TROMETHAMINE 30 MG/ML
INJECTION, SOLUTION INTRAMUSCULAR; INTRAVENOUS
Status: COMPLETED
Start: 2018-05-29 | End: 2018-05-29

## 2018-05-29 RX ORDER — SODIUM CHLORIDE, SODIUM LACTATE, POTASSIUM CHLORIDE, CALCIUM CHLORIDE 600; 310; 30; 20 MG/100ML; MG/100ML; MG/100ML; MG/100ML
INJECTION, SOLUTION INTRAVENOUS CONTINUOUS
Status: DISCONTINUED | OUTPATIENT
Start: 2018-05-29 | End: 2018-05-29 | Stop reason: HOSPADM

## 2018-05-29 RX ORDER — ACETAMINOPHEN 325 MG/1
650 TABLET ORAL EVERY 4 HOURS PRN
Status: DISCONTINUED | OUTPATIENT
Start: 2018-06-01 | End: 2018-06-01 | Stop reason: HOSPADM

## 2018-05-29 RX ORDER — DEXAMETHASONE SODIUM PHOSPHATE 4 MG/ML
INJECTION, SOLUTION INTRA-ARTICULAR; INTRALESIONAL; INTRAMUSCULAR; INTRAVENOUS; SOFT TISSUE PRN
Status: DISCONTINUED | OUTPATIENT
Start: 2018-05-29 | End: 2018-05-29

## 2018-05-29 RX ORDER — DIPHENHYDRAMINE HYDROCHLORIDE 50 MG/ML
25 INJECTION INTRAMUSCULAR; INTRAVENOUS EVERY 6 HOURS PRN
Status: DISCONTINUED | OUTPATIENT
Start: 2018-05-29 | End: 2018-06-01 | Stop reason: HOSPADM

## 2018-05-29 RX ORDER — LANOLIN 100 %
OINTMENT (GRAM) TOPICAL
Status: DISCONTINUED | OUTPATIENT
Start: 2018-05-29 | End: 2018-06-01 | Stop reason: HOSPADM

## 2018-05-29 RX ORDER — CEFAZOLIN SODIUM 1 G/3ML
1 INJECTION, POWDER, FOR SOLUTION INTRAMUSCULAR; INTRAVENOUS SEE ADMIN INSTRUCTIONS
Status: DISCONTINUED | OUTPATIENT
Start: 2018-05-29 | End: 2018-05-29

## 2018-05-29 RX ORDER — BUPIVACAINE HYDROCHLORIDE 7.5 MG/ML
INJECTION, SOLUTION INTRASPINAL PRN
Status: DISCONTINUED | OUTPATIENT
Start: 2018-05-29 | End: 2018-05-29

## 2018-05-29 RX ORDER — ACETAMINOPHEN 325 MG/1
975 TABLET ORAL EVERY 8 HOURS
Status: DISCONTINUED | OUTPATIENT
Start: 2018-05-29 | End: 2018-06-01 | Stop reason: HOSPADM

## 2018-05-29 RX ORDER — EPHEDRINE SULFATE 50 MG/ML
5 INJECTION, SOLUTION INTRAMUSCULAR; INTRAVENOUS; SUBCUTANEOUS
Status: DISCONTINUED | OUTPATIENT
Start: 2018-05-29 | End: 2018-05-30

## 2018-05-29 RX ORDER — BISACODYL 10 MG
10 SUPPOSITORY, RECTAL RECTAL DAILY PRN
Status: DISCONTINUED | OUTPATIENT
Start: 2018-05-31 | End: 2018-06-01 | Stop reason: HOSPADM

## 2018-05-29 RX ORDER — LIDOCAINE 40 MG/G
CREAM TOPICAL
Status: DISCONTINUED | OUTPATIENT
Start: 2018-05-29 | End: 2018-05-29

## 2018-05-29 RX ORDER — FENTANYL CITRATE 50 UG/ML
25-50 INJECTION, SOLUTION INTRAMUSCULAR; INTRAVENOUS
Status: DISCONTINUED | OUTPATIENT
Start: 2018-05-29 | End: 2018-05-29 | Stop reason: HOSPADM

## 2018-05-29 RX ORDER — SODIUM CHLORIDE, SODIUM LACTATE, POTASSIUM CHLORIDE, CALCIUM CHLORIDE 600; 310; 30; 20 MG/100ML; MG/100ML; MG/100ML; MG/100ML
INJECTION, SOLUTION INTRAVENOUS CONTINUOUS
Status: DISCONTINUED | OUTPATIENT
Start: 2018-05-29 | End: 2018-05-29

## 2018-05-29 RX ORDER — OXYTOCIN/0.9 % SODIUM CHLORIDE 30/500 ML
PLASTIC BAG, INJECTION (ML) INTRAVENOUS CONTINUOUS PRN
Status: DISCONTINUED | OUTPATIENT
Start: 2018-05-29 | End: 2018-05-29

## 2018-05-29 RX ORDER — ONDANSETRON 2 MG/ML
INJECTION INTRAMUSCULAR; INTRAVENOUS PRN
Status: DISCONTINUED | OUTPATIENT
Start: 2018-05-29 | End: 2018-05-29

## 2018-05-29 RX ADMIN — ACETAMINOPHEN 975 MG: 325 TABLET ORAL at 06:14

## 2018-05-29 RX ADMIN — ONDANSETRON 4 MG: 2 INJECTION INTRAMUSCULAR; INTRAVENOUS at 07:08

## 2018-05-29 RX ADMIN — ACETAMINOPHEN 975 MG: 325 TABLET, FILM COATED ORAL at 22:51

## 2018-05-29 RX ADMIN — Medication 340 ML/HR: at 07:41

## 2018-05-29 RX ADMIN — DEXAMETHASONE SODIUM PHOSPHATE 4 MG: 4 INJECTION, SOLUTION INTRA-ARTICULAR; INTRALESIONAL; INTRAMUSCULAR; INTRAVENOUS; SOFT TISSUE at 07:08

## 2018-05-29 RX ADMIN — Medication 2.5 MG: at 07:55

## 2018-05-29 RX ADMIN — MORPHINE SULFATE 0.1 MG: 1 INJECTION, SOLUTION EPIDURAL; INTRATHECAL; INTRAVENOUS at 07:15

## 2018-05-29 RX ADMIN — ACETAMINOPHEN 975 MG: 325 TABLET, FILM COATED ORAL at 14:36

## 2018-05-29 RX ADMIN — PHENYLEPHRINE HYDROCHLORIDE 100 MCG: 10 INJECTION, SOLUTION INTRAMUSCULAR; INTRAVENOUS; SUBCUTANEOUS at 07:53

## 2018-05-29 RX ADMIN — Medication 5 MG: at 07:25

## 2018-05-29 RX ADMIN — Medication 30 ML: at 07:02

## 2018-05-29 RX ADMIN — Medication 2.5 MG: at 08:00

## 2018-05-29 RX ADMIN — Medication 2.5 MG: at 07:49

## 2018-05-29 RX ADMIN — KETOROLAC TROMETHAMINE 30 MG: 30 INJECTION, SOLUTION INTRAMUSCULAR at 20:49

## 2018-05-29 RX ADMIN — SODIUM CITRATE AND CITRIC ACID MONOHYDRATE 30 ML: 500; 334 SOLUTION ORAL at 07:02

## 2018-05-29 RX ADMIN — CEFAZOLIN SODIUM 2 G: 2 INJECTION, SOLUTION INTRAVENOUS at 07:10

## 2018-05-29 RX ADMIN — Medication 5 MG: at 07:15

## 2018-05-29 RX ADMIN — BUPIVACAINE HYDROCHLORIDE IN DEXTROSE 12 MG: 7.5 INJECTION, SOLUTION SUBARACHNOID at 07:15

## 2018-05-29 RX ADMIN — SODIUM CHLORIDE, SODIUM LACTATE, POTASSIUM CHLORIDE, CALCIUM CHLORIDE AND DEXTROSE MONOHYDRATE: 5; 600; 310; 30; 20 INJECTION, SOLUTION INTRAVENOUS at 16:56

## 2018-05-29 RX ADMIN — KETOROLAC TROMETHAMINE 30 MG: 30 INJECTION, SOLUTION INTRAMUSCULAR at 14:36

## 2018-05-29 RX ADMIN — SODIUM CHLORIDE, POTASSIUM CHLORIDE, SODIUM LACTATE AND CALCIUM CHLORIDE: 600; 310; 30; 20 INJECTION, SOLUTION INTRAVENOUS at 07:01

## 2018-05-29 RX ADMIN — SODIUM CHLORIDE, POTASSIUM CHLORIDE, SODIUM LACTATE AND CALCIUM CHLORIDE: 600; 310; 30; 20 INJECTION, SOLUTION INTRAVENOUS at 07:48

## 2018-05-29 RX ADMIN — Medication 5 MG: at 07:17

## 2018-05-29 RX ADMIN — KETOROLAC TROMETHAMINE 30 MG: 30 INJECTION, SOLUTION INTRAMUSCULAR at 09:09

## 2018-05-29 RX ADMIN — Medication 2.5 MG: at 07:46

## 2018-05-29 RX ADMIN — SENNOSIDES AND DOCUSATE SODIUM 1 TABLET: 8.6; 5 TABLET ORAL at 22:50

## 2018-05-29 RX ADMIN — OXYTOCIN-SODIUM CHLORIDE 0.9% IV SOLN 30 UNIT/500ML 100 ML/HR: 30-0.9/5 SOLUTION at 11:56

## 2018-05-29 RX ADMIN — Medication 5 MG: at 07:52

## 2018-05-29 RX ADMIN — SODIUM CHLORIDE, POTASSIUM CHLORIDE, SODIUM LACTATE AND CALCIUM CHLORIDE 1000 ML: 600; 310; 30; 20 INJECTION, SOLUTION INTRAVENOUS at 05:26

## 2018-05-29 NOTE — OP NOTE
Procedure Date: 2018      DATE OF PROCEDURE:  2018      PREOPERATIVE DIAGNOSES:   1.  39 plus 0 weeks gestation.   2.  Previous  section; declines trial of labor.      POSTOPERATIVE DIAGNOSES:   1.  39 plus 0 weeks gestation.   2.  Previous  section; declines trial of labor.      PROCEDURE:  Repeat low transverse  section.      SURGEON:  Loreta Rodriguez MD      ASSISTANT:  TARA Collado, JEFF Loo's assistance was required due to repeat nature of the  section and for assitance with retraction and visualization.    ANESTHESIA:  Spinal.      COMPLICATIONS:  None.      ESTIMATED BLOOD LOSS:  800 mL.      FINDINGS:  A male infant in occiput anterior position with a weight of 6 pounds 15 ounces and Apgars of 9 and 9.  Normal-appearing uterus, tubes and bilateral ovaries.  Occasional omental adhesion to uterine fundus.      INDICATIONS:  Mile is a 22-year-old G2, P1 who was brought to Labor and Delivery at 39 plus 0 weeks' gestation for scheduled repeat  section.  Mile has a history of previous  section due to chorioamnionitis and cephalopelvic disproportion and was therefore counseled for repeat  section.  Risks, benefits and alternatives were discussed.      DESCRIPTION OF PROCEDURE:  Mile was taken to the operating room where spinal anesthesia was administered without difficulty.  She was then prepared and draped in the normal sterile fashion in dorsal supine position with left lateral tilt.  Timeout was performed to identify correct patient and procedure.  Pfannenstiel skin incision was made over preexisting scar using a scalpel and this was carried down to the underlying layer of fascia using the Bovie.  Fascia was then incised in the midline and this incision was extended laterally using the Baca scissor.  Superior aspect of the fascial incision was then grasped with Kocher clamps x 2, elevated, and the rectus muscles dissected.   Similar attention was turned to the inferior border.  The peritoneum was then entered sharply and this incision was extended with lateral retraction.  The bladder blade was placed and the vesicouterine peritoneum was identified, tented, and the bladder flap created.  The bladder blade was replaced and the lower uterine segment was then incised in a transverse fashion.  Amniotomy revealed clear fluid.  Infant head was delivered from the occiput anterior position.  Due to some difficulty with flexion and delivery through the incision, one attempt was made using the Kiwi vacuum but due to nearly immediate popoff was discarded.  Fundal pressure was applied and the infant was delivered without difficulty.  Nuchal cord x 2 is reduced and remainder of the infant was delivered and placed on the maternal abdomen.  Cord clamping was delayed by 1 minute.  Infant was handed off to awaiting nurses.  The uterus was exteriorized and cleared of all clot and debris.  The placenta was delivered manually.  The uterus was cleared of all clot and debris.  Uterine incision was reapproximated using a 0 Vicryl in a running locked fashion.  A second imbricating suture was similarly placed.  The uterus was then replaced into the abdominal cavity and gutters were cleared of all clot and debris.  Peritoneum was reapproximated using a 3-0 Vicryl in a running stitch.  The fascia was reapproximated using 0 looped PDS in a running fashion.  Irrigation was performed prior to skin closure.  This was completed using a 4-0 Monocryl in a subcuticular stitch.  Bandages were placed and the patient was taken to recovery room in stable condition.         LORETA JOSEPH MD             D: 2018   T: 2018   MT: CC      Name:     BHAVANA BRIDGES   MRN:      [id number]        Account:        [id number]   :      1995           Procedure Date: 2018      Document: [id number]       cc: Loreta Joseph MD

## 2018-05-29 NOTE — PLAN OF CARE
Data: Mile Cobos transferred to 4431 via bed at 1030. Baby transferred via parent's arms.  Action: Receiving unit notified of transfer: Yes. Patient and family notified of room change. Report given to Melly BRIAN RN at 1030. Belongings sent to receiving unit. Accompanied by Registered Nurse. Oriented patient to surroundings. Call light within reach. ID bands double-checked with receiving RN.  Response: Patient tolerated transfer and is stable.

## 2018-05-29 NOTE — PLAN OF CARE
Problem: Patient Care Overview  Goal: Plan of Care/Patient Progress Review  Outcome: Improving  Up with standby assist to bathroom for first time, tolerated well. Flaca care explained and done. Pain well controlled with medications given as needed.

## 2018-05-29 NOTE — PLAN OF CARE
Patient presenting to triage for scheduled repeat  section. Verbal consent for EUM. [place] & US applied. VSS. History reviewed, denies problems with the pregnancy. Has had previous  section. States needed IV iron after last delivery due to increased blood loss. Patient prepared for surgery without difficulty. Questions answered. Monitors removed at this time.

## 2018-05-29 NOTE — PLAN OF CARE
Problem: Patient Care Overview  Goal: Plan of Care/Patient Progress Review  Outcome: Improving  Postpartum assessments and VS are WDL, patient is progressing as expected following  delivery. Breast feeding is going well, no concerns.   Patient medicated for pain, see MAR. Pt up ambulating, on regular diet, pain well controlled. Anticipate discharge on 18

## 2018-05-29 NOTE — ANESTHESIA PROCEDURE NOTES
Peripheral nerve/Neuraxial procedure note : intrathecal  Pre-Procedure  Performed by MARCELO BRAXTON  Location: OR      Pre-Anesthestic Checklist: patient identified, IV checked, risks and benefits discussed, informed consent, monitors and equipment checked, pre-op evaluation and at physician/surgeon's request    Timeout  Correct Patient: Yes   Correct Procedure: Yes   Correct Site: Yes   Correct Laterality: N/A   Correct Position: Yes   Site Marked: N/A   .   Procedure Documentation    .    Procedure:    Intrathecal.  Insertion Site:L3-4  (midline approach)      Patient Prep;mask, sterile gloves, povidone-iodine 7.5% surgical scrub, patient draped.  .  Needle: Lelia-Skinny Spinal Needle (gauge): 24  Spinal/LP Needle Length (inches): 3.5 # of attempts: 1 and # of redirects: : 0. Introducer used Introducer: 20 G .       Assessment/Narrative  Paresthesias: No.  .  .  clear CSF fluid removed . Comments:  Injected 12mg Bupivacaine 0.75% + dextrose + 100 mcg Duramorph  Patient tolerated the procedure well and without complications.  Patient laid flat immediately.

## 2018-05-29 NOTE — IP AVS SNAPSHOT
MRN:[id number]                      After Visit Summary   2018    Mile Cobos    MRN: [id number]           Thank you!     Thank you for choosing [facility] for your care. Our goal is always to provide you with excellent care. Hearing back from our patients is one way we can continue to improve our services. Please take a few minutes to complete the written survey that you may receive in the mail after you visit with us. Thank you!        Patient Information     Date Of Birth          1995        Designated Caregiver       Most Recent Value    Caregiver    Name of designated caregiver Luis Arthur    Phone number of caregiver [phone]      About your hospital stay     You were admitted on:  May 29, 2018 You last received care in the:  [address]    You were discharged on:  2018       Who to Call     For medical emergencies, please call 911.  For non-urgent questions about your medical care, please call your primary care provider or clinic, [phone]  For questions related to your surgery, please call your surgery clinic        Attending Provider     Provider Specialty    Loreta Rodriguez MD OB/Gyn       Primary Care Provider Office Phone # Fax #    [facility] [phone]      After Care Instructions     Activity       Review discharge instructions            Diet       Resume previous diet            Discharge Instructions - Postpartum visit       Schedule postpartum visit with your provider and return to clinic in 6 weeks.                  Further instructions from your care team       Postop  Birth Instructions    Activity       Do not lift more than 10 pounds for 6 weeks after surgery.  Ask family and friends for help when you need it.    No driving until you have stopped taking your pain medications (usually two weeks after surgery).    No heavy exercise or activity for 6 weeks.   Don't do anything that will put a strain on your surgery site.    Don't strain when using the toilet.  Your care team may prescribe a stool softener if you have problems with your bowel movements.     To care for your incision:       Keep the incision clean and dry.    Do not soak your incision in water. No swimming or hot tubs until it has fully healed. You may soak in the bathtub if the water level is below your incision.    Do not use peroxide, gel, cream, lotion, or ointment on your incision.    Adjust your clothes to avoid pressure on your surgery site (check the elastic in your underwear for example).     You may see a small amount of clear or pink drainage and this is normal.  Check with your health care provider:       If the drainage increases or has an odor.    If the incision reddens, you have swelling, or develop a rash.    If you have increased pain and the medicine we prescribed doesn't help.    If you have a fever above 100.4 F (38 C) with or without chills when placing thermometer under your tongue.   The area around your incision (surgery wound), will feel numb.  This is normal. The numbness should go away in less than a year.     Keep your hands clean:  Always wash your hands before touching your incision (surgery wound). This helps reduce your risk of infection. If your hands aren't dirty, you may use an alcohol hand-rub to clean your hands. Keep your nails clean and short.    Call your healthcare provider if you have any of these symptoms:       You soak a sanitary pad with blood within 1 hour, or you see blood clots larger than a golf ball.    Bleeding that lasts more than 6 weeks.    Vaginal discharge that smells bad.    Severe pain, cramping or tenderness in your lower belly area.    A need to urinate more frequently (use the toilet more often), more urgently (use the toilet very quickly), or it burns when you urinate.    Nausea and vomiting.    Redness, swelling or pain around a vein in your  "leg.    Problems breastfeeding or a red or painful area on your breast.    Chest pain and cough or are gasping for air.    Problems with coping with sadness, anxiety or depression. If you have concerns about hurting yourself or the baby, call your provider immediately.      You have questions or concerns after you return home.                  Pending Results     No orders found from 2018 to 2018.            Statement of Approval     Ordered          18 0750  I have reviewed and agree with all the recommendations and orders detailed in this document.  EFFECTIVE NOW     Approved and electronically signed by:  Loreta Rodriguez MD             Admission Information     Date & Time Provider Department Dept. Phone    2018 Loreta Rodriguez MD [address] [phone]      Your Vitals Were     Blood Pressure Pulse Temperature Respirations Height Weight    125/70 79 98.6  F (37  C) (Oral) 16 1.473 m (4' 10\") 63 kg (139 lb)    Last Period Pulse Oximetry BMI (Body Mass Index)             2017 (Exact Date) 98% 29.05 kg/m2         [employer] Information     [employer] lets you send messages to your doctor, view your test results, renew your prescriptions, schedule appointments and more. To sign up, go to [email or web address] . Click on \"Log in\" on the left side of the screen, which will take you to the Welcome page. Then click on \"Sign up Now\" on the right side of the page.     You will be asked to enter the access code listed below, as well as some personal information. Please follow the directions to create your username and password.     Your access code is: [email or web address]  Expires: 2018 12:57 PM     Your access code will  in 90 days. If you need help or a new code, please call your [place] clinic or [phone].        Care EveryWhere ID     This is your Care EveryWhere ID. This could be used by other organizations to access your [place] medical " records  [id number]        Equal Access to Services     [facility] : Zo Guerrier, waradha kidd, qastanley mosquera, ralph beckett. So [facility] [phone].    ATENCIÓN: Si habla español, tiene a monreal disposición servicios gratuitos de asistencia lingüística. Alonsoame al [phone].    We comply with applicable federal civil rights laws and [place] laws. We do not discriminate on the basis of race, color, national origin, age, disability, sex, sexual orientation, or gender identity.               Review of your medicines      START taking        Dose / Directions    oxyCODONE IR 5 MG tablet   Commonly known as:  ROXICODONE        Dose:  5-10 mg   Take 1-2 tablets (5-10 mg) by mouth every 3 hours as needed for other (pain control or improvement in physical function. Hold dose for analgesic side effects.)   Quantity:  30 tablet   Refills:  0       senna-docusate 8.6-50 MG per tablet   Commonly known as:  SENOKOT-S;PERICOLACE        Dose:  2 tablet   Take 2 tablets by mouth 2 times daily as needed for constipation   Quantity:  100 tablet   Refills:  1         CONTINUE these medicines which have NOT CHANGED        Dose / Directions    fish oil-omega-3 fatty acids 1000 MG capsule        Dose:  2 g   Take 2 g by mouth daily   Refills:  0       IRON SUPPLEMENT PO        Dose:  27 mg   Take 27 mg by mouth   Refills:  0       prenatal multivitamin plus iron 27-0.8 MG Tabs per tablet        Dose:  2 tablet   Take 2 tablets by mouth daily   Refills:  0            Where to get your medicines      Some of these will need a paper prescription and others can be bought over the counter. Ask your nurse if you have questions.     Bring a paper prescription for each of these medications     oxyCODONE IR 5 MG tablet    senna-docusate 8.6-50 MG per tablet                Protect others around you: Learn how to safely use, store and throw away your medicines at  www.disposemymeds.org.        Information about OPIOIDS     PRESCRIPTION OPIOIDS: WHAT YOU NEED TO KNOW   You have a prescription for an opioid (narcotic) pain medicine. Opioids can cause addiction. If you have a history of chemical dependency of any type, you are at a higher risk of becoming addicted to opioids. Only take this medicine after all other options have been tried. Take it for as short a time and as few doses as possible.     Do not:    Drive. If you drive while taking these medicines, you could be arrested for driving under the influence (DUI).    Operate heavy machinery    Do any other dangerous activities while taking these medicines.     Drink any alcohol while taking these medicines.      Take with any other medicines that contain acetaminophen. Read all labels carefully. Look for the word  acetaminophen  or  Tylenol.  Ask your pharmacist if you have questions or are unsure.    Store your pills in a secure place, locked if possible. We will not replace any lost or stolen medicine. If you don t finish your medicine, please throw away (dispose) as directed by your pharmacist. The [place] Pollution [employer] has more information about safe disposal: https://www.pca.[facility].[place].us/living-green/managing-unwanted-medications    All opioids tend to cause constipation. Drink plenty of water and eat foods that have a lot of fiber, such as fruits, vegetables, prune juice, apple juice and high-fiber cereal. Take a laxative (Miralax, milk of magnesia, Colace, Senna) if you don t move your bowels at least every other day.              Medication List: This is a list of all your medications and when to take them. Check marks below indicate your daily home schedule. Keep this list as a reference.      Medications           Morning Afternoon Evening Bedtime As Needed    fish oil-omega-3 fatty acids 1000 MG capsule   Take 2 g by mouth daily                                IRON SUPPLEMENT PO   Take 27 mg by mouth    Last time this was given:  325 mg on 5/31/2018 10:35 AM                                oxyCODONE IR 5 MG tablet   Commonly known as:  ROXICODONE   Take 1-2 tablets (5-10 mg) by mouth every 3 hours as needed for other (pain control or improvement in physical function. Hold dose for analgesic side effects.)   Last time this was given:  5 mg on 5/30/2018 12:44 PM                                prenatal multivitamin plus iron 27-0.8 MG Tabs per tablet   Take 2 tablets by mouth daily                                senna-docusate 8.6-50 MG per tablet   Commonly known as:  SENOKOT-S;PERICOLACE   Take 2 tablets by mouth 2 times daily as needed for constipation   Last time this was given:  1 tablet on 6/1/2018  7:38 AM

## 2018-05-29 NOTE — ANESTHESIA CARE TRANSFER NOTE
Patient: Mile Cobos    Procedure(s):  REPEAT  SECTION - Wound Class: II-Clean Contaminated    Diagnosis: PRIOR  SECTION, DECLINES TRIAL OF LABOR  Diagnosis Additional Information: No value filed.    Anesthesia Type:   Spinal     Note:  Airway :Room Air  Patient transferred to:PACU  Comments: Anesthesia Care Note    Patient: Mile Cobos    Transferred to: PACU    Patient vital signs: Stable    Airway: None    Monitors placed. VSS. PIV patent. No change in dentition. Report given to REBECCA.      Bernadine Kaye CRNA   2018  Handoff Report: Identifed the Patient, Identified the Reponsible Provider, Reviewed the pertinent medical history, Discussed the surgical course, Reviewed Intra-OP anesthesia mangement and issues during anesthesia, Set expectations for post-procedure period and Allowed opportunity for questions and acknowledgement of understanding      Vitals: (Last set prior to Anesthesia Care Transfer)    CRNA VITALS  2018 0757 - 2018 0832      2018             NIBP: 118/85    NIBP Mean: 86    Resp Rate (set): 10                Electronically Signed By: TARA Tolentino CRNA  May 29, 2018  8:32 AM

## 2018-05-29 NOTE — PLAN OF CARE
Problem:  Delivery (Adult,Obstetrics,Pediatric)  Goal: Signs and Symptoms of Listed Potential Problems Will be Absent, Minimized or Managed ( Delivery)  Signs and symptoms of listed potential problems will be absent, minimized or managed by discharge/transition of care (reference  Delivery (Adult,Obstetrics,Pediatric) CPG).   Outcome: Improving  No complications from c/section delivery to this point. BS stable, minimal pain, vaginal bleeding small. Fundus firm.

## 2018-05-29 NOTE — IP AVS SNAPSHOT
[address]    [place] [address]    Phone:  [phone]                                       After Visit Summary   5/29/2018    Mile Cobos    MRN: [id number]           After Visit Summary Signature Page     I have received my discharge instructions, and my questions have been answered. I have discussed any challenges I see with this plan with the nurse or doctor.    ..........................................................................................................................................  Patient/Patient Representative Signature      ..........................................................................................................................................  Patient Representative Print Name and Relationship to Patient    ..................................................               ................................................  Date                                            Time    ..........................................................................................................................................  Reviewed by Signature/Title    ...................................................              ..............................................  Date                                                            Time

## 2018-05-29 NOTE — ANESTHESIA PREPROCEDURE EVALUATION
Procedure: Procedure(s):   SECTION  Preop diagnosis: PRIOR  SECTION, DECLINES TRIAL OF LABOR    No Known Allergies  Past Medical History:   Diagnosis Date     Abnormal Pap smear of cervix 2015    AS-CUS, HPV Negative     Anxiety     Has never been on medications for it, stated it started postpartum.     History of chlamydia     In high school, was treated and had a treatment of cure.     History of gonorrhea     In high school, was treated and had a treatment of cure.     IUD contraception 2015    Mirena removed 17 by Dr. Arnold.     Past Surgical History:   Procedure Laterality Date      SECTION N/A 2015    Procedure:  SECTION;  Surgeon: Malena Arnold MD;  Location:  L+D     Social History   Substance Use Topics     Smoking status: Former Smoker     Types: Cigarettes     Smokeless tobacco: Never Used      Comment: Stopped smoking when found out is pregnanat with first child.     Alcohol use No     Prior to Admission medications    Medication Sig Start Date End Date Taking? Authorizing Provider   fish oil-omega-3 fatty acids 1000 MG capsule Take 2 g by mouth daily   Yes Reported, Patient   Prenatal Vit-Fe Fumarate-FA (PRENATAL MULTIVITAMIN  PLUS IRON) 27-0.8 MG TABS Take 2 tablets by mouth daily    Yes Reported, Patient   Ferrous Sulfate (IRON SUPPLEMENT PO) Take 27 mg by mouth     Reported, Patient     Current Facility-Administered Medications Ordered in Epic   Medication Dose Route Frequency Last Rate Last Dose     ceFAZolin (ANCEF) 1 g vial to attach to  ml bag for ADULT or 50 ml bag for PEDS  1 g Intravenous See Admin Instructions         ceFAZolin (ANCEF) intermittent infusion 2 g in 100 mL dextrose PRE-MIX  2 g Intravenous Pre-Op/Pre-procedure x 1 dose         lactated ringers infusion   Intravenous Continuous         lidocaine (LMX4) cream   Topical Q1H PRN         lidocaine 1 % 1 mL  1 mL Other Q1H PRN         ondansetron (ZOFRAN) 2 MG/ML  injection             oxytocin in 0.9% NaCl (PITOCIN) 30 units/500 mL infusion             sodium chloride (PF) 0.9% PF flush 3 mL  3 mL Intravenous Q1H PRN         sodium chloride (PF) 0.9% PF flush 3 mL  3 mL Intravenous Q8H         sodium citrate-citric acid (BICITRA) solution 30 mL  30 mL Oral Pre-Op/Pre-procedure x 1 dose         No current Epic-ordered outpatient prescriptions on file.       lactated ringers       Wt Readings from Last 1 Encounters:   05/29/18 63 kg (139 lb)     Temp Readings from Last 1 Encounters:   05/29/18 36.7  C (98.1  F) (Temporal)     BP Readings from Last 6 Encounters:   05/29/18 125/73   05/22/18 104/70   05/15/18 120/78   05/11/18 114/56   04/24/18 102/60   04/11/18 102/58     Pulse Readings from Last 4 Encounters:   10/25/17 67   06/08/15 112   10/23/13 74     Resp Readings from Last 1 Encounters:   05/29/18 16     SpO2 Readings from Last 1 Encounters:   04/06/18 98%     Recent Labs   Lab Test  04/06/18   1740   NA  138   POTASSIUM  3.7   CHLORIDE  105   CO2  26   ANIONGAP  7   GLC  72   BUN  5*   CR  0.48*   NADYA  8.8     Recent Labs   Lab Test  04/06/18   1740   AST  17   ALT  15   ALKPHOS  147   BILITOTAL  0.2     Recent Labs   Lab Test  05/29/18   0555  04/06/18   1740   WBC  6.3  9.3   HGB  10.4*  12.4   PLT  203  240     Recent Labs   Lab Test  05/29/18   0555  10/25/17   1406   ABO  PENDING  B   RH   --   Neg     No results for input(s): INR, PTT in the last 83532 hours.   No results for input(s): TROPI in the last 64026 hours.  No results for input(s): PH, PCO2, PO2, HCO3 in the last 29462 hours.  No results for input(s): HCG in the last 82213 hours.  No results found for this or any previous visit (from the past 744 hour(s)).    RECENT LABS:   ECG:   ECHO:       Anesthesia Evaluation     .             ROS/MED HX    ENT/Pulmonary:       Neurologic:       Cardiovascular:         METS/Exercise Tolerance:  >4 METS   Hematologic:         Musculoskeletal:         GI/Hepatic:          Renal/Genitourinary:         Endo:         Psychiatric:     (+) psychiatric history anxiety      Infectious Disease:         Malignancy:         Other:                     Physical Exam  Normal systems: dental    Airway   Mallampati: II    Dental     Cardiovascular   Rhythm and rate: regular and normal      Pulmonary    breath sounds clear to auscultation    Other findings: Patient blood type B Negative, Type and Screen back - no Abs                Anesthesia Plan      History & Physical Review  History and physical reviewed and following examination; no interval change.    ASA Status:  2 .    NPO Status:  > 8 hours    Plan for Spinal   PONV prophylaxis:  Ondansetron (or other 5HT-3) and Dexamethasone or Solumedrol       Postoperative Care  Postoperative pain management:  Multi-modal analgesia.      Consents  Anesthetic plan, risks, benefits and alternatives discussed with:  Patient..                          .

## 2018-05-29 NOTE — L&D DELIVERY NOTE
Obstetrics Brief Operative Note    Pre-operative diagnosis: Repeat  section   Post-operative diagnosis: Same   Procedure: Repeat low transverse  section   Surgeon: Loreta Rodriguez MD   Assistant(s): Sherin Loo CNM   Anesthesia: Spinal anesthesia   Estimated blood loss: 800ml   Complications: None   Findings: Live   Male  Cephalic presentation:  occiput anterior  APGARS: 1 minute: 9   5 minute: 9  Weight: 6lbs 15oz  Placenta: normal  Tubes: normal  Uterus: normal  Ovaries: normal     Primary OB: Rodriguez

## 2018-05-30 LAB
ABO + RH BLD: NORMAL
ABO + RH BLD: NORMAL
BLOOD BANK CMNT PATIENT-IMP: NORMAL
DATE RH IMM GL GVN: NORMAL
FETAL CELL SCN BLD QL ROSETTE: NORMAL
HGB BLD-MCNC: 8.8 G/DL (ref 11.7–15.7)
RH IG VIALS RECOM PATIENT: NORMAL

## 2018-05-30 PROCEDURE — 36415 COLL VENOUS BLD VENIPUNCTURE: CPT | Performed by: OBSTETRICS & GYNECOLOGY

## 2018-05-30 PROCEDURE — 25000132 ZZH RX MED GY IP 250 OP 250 PS 637: Performed by: OBSTETRICS & GYNECOLOGY

## 2018-05-30 PROCEDURE — 85461 HEMOGLOBIN FETAL: CPT | Performed by: OBSTETRICS & GYNECOLOGY

## 2018-05-30 PROCEDURE — 25000128 H RX IP 250 OP 636: Performed by: OBSTETRICS & GYNECOLOGY

## 2018-05-30 PROCEDURE — 86901 BLOOD TYPING SEROLOGIC RH(D): CPT | Performed by: OBSTETRICS & GYNECOLOGY

## 2018-05-30 PROCEDURE — 85018 HEMOGLOBIN: CPT | Performed by: OBSTETRICS & GYNECOLOGY

## 2018-05-30 PROCEDURE — 12000037 ZZH R&B POSTPARTUM INTERMEDIATE

## 2018-05-30 PROCEDURE — 86900 BLOOD TYPING SEROLOGIC ABO: CPT | Performed by: OBSTETRICS & GYNECOLOGY

## 2018-05-30 PROCEDURE — 25000125 ZZHC RX 250: Performed by: OBSTETRICS & GYNECOLOGY

## 2018-05-30 RX ORDER — ONDANSETRON 2 MG/ML
4 INJECTION INTRAMUSCULAR; INTRAVENOUS EVERY 6 HOURS PRN
Status: DISCONTINUED | OUTPATIENT
Start: 2018-05-30 | End: 2018-06-01 | Stop reason: HOSPADM

## 2018-05-30 RX ORDER — ONDANSETRON 4 MG/1
4-8 TABLET, ORALLY DISINTEGRATING ORAL EVERY 8 HOURS PRN
Status: DISCONTINUED | OUTPATIENT
Start: 2018-05-30 | End: 2018-06-01 | Stop reason: HOSPADM

## 2018-05-30 RX ORDER — ONDANSETRON 4 MG/1
4-8 TABLET, FILM COATED ORAL EVERY 8 HOURS PRN
Status: DISCONTINUED | OUTPATIENT
Start: 2018-05-30 | End: 2018-06-01 | Stop reason: HOSPADM

## 2018-05-30 RX ORDER — FERROUS SULFATE 325(65) MG
325 TABLET ORAL DAILY
Status: DISCONTINUED | OUTPATIENT
Start: 2018-05-30 | End: 2018-06-01 | Stop reason: HOSPADM

## 2018-05-30 RX ADMIN — ACETAMINOPHEN 975 MG: 325 TABLET, FILM COATED ORAL at 14:19

## 2018-05-30 RX ADMIN — HUMAN RHO(D) IMMUNE GLOBULIN 300 MCG: 300 INJECTION, SOLUTION INTRAMUSCULAR at 12:44

## 2018-05-30 RX ADMIN — OXYCODONE HYDROCHLORIDE 5 MG: 5 TABLET ORAL at 12:44

## 2018-05-30 RX ADMIN — ACETAMINOPHEN 975 MG: 325 TABLET, FILM COATED ORAL at 22:07

## 2018-05-30 RX ADMIN — IBUPROFEN 800 MG: 400 TABLET ORAL at 09:28

## 2018-05-30 RX ADMIN — SENNOSIDES AND DOCUSATE SODIUM 1 TABLET: 8.6; 5 TABLET ORAL at 22:07

## 2018-05-30 RX ADMIN — IBUPROFEN 800 MG: 400 TABLET ORAL at 19:24

## 2018-05-30 RX ADMIN — KETOROLAC TROMETHAMINE 30 MG: 30 INJECTION, SOLUTION INTRAMUSCULAR at 03:11

## 2018-05-30 RX ADMIN — FERROUS SULFATE TAB 325 MG (65 MG ELEMENTAL FE) 325 MG: 325 (65 FE) TAB at 12:44

## 2018-05-30 RX ADMIN — ONDANSETRON 4 MG: 4 TABLET, FILM COATED ORAL at 16:28

## 2018-05-30 RX ADMIN — OXYCODONE HYDROCHLORIDE 5 MG: 5 TABLET ORAL at 06:34

## 2018-05-30 RX ADMIN — SENNOSIDES AND DOCUSATE SODIUM 1 TABLET: 8.6; 5 TABLET ORAL at 09:29

## 2018-05-30 RX ADMIN — ACETAMINOPHEN 975 MG: 325 TABLET, FILM COATED ORAL at 06:18

## 2018-05-30 RX ADMIN — OXYCODONE HYDROCHLORIDE 5 MG: 5 TABLET ORAL at 09:28

## 2018-05-30 NOTE — PROVIDER NOTIFICATION
Dr. Rodriguez updated via phone that patient was feeling nauseated this afternoon. She has been eating a regular diet without issues since  and passing gas. She reported she had not had much to eat since her breakfast and has been getting oxycodone, ibuprofen, and her daily dose of ferrous sulfate. Order received for zofran PRN.

## 2018-05-30 NOTE — PLAN OF CARE
Problem: Patient Care Overview  Goal: Plan of Care/Patient Progress Review  Outcome: Therapy, progress toward functional goals as expected  VSS, o2 97% and above, 3+ pitting edema in both feet - pt states this is consistent with last weeks of pregnancy - massage helps discomfort, denies pain, Toradol and tylenol given per orders, nursing on demand, dressing dry and intact, FF@U, PIV saline locked, pt on regular diet, pt ambulating independently in room, middleton removed. Continue to monitor

## 2018-05-30 NOTE — LACTATION NOTE
Initial visit. Baby  Latching onto to the left breast better than the right, except at this last feeding.  Reviewed the second night and how to check for correct latch.    Breastfeeding general information reviewed.   Advised to breastfeed exclusively, on demand, avoid pacifiers, bottles and formula unless medically indicated.  Encouraged rooming in, skin to skin, feeding on demand 8-12x/day or sooner if baby cues.  Explained benefits of holding and skin to skin.  Encouraged lots of skin to skin. Instructed on hand expression.   Continues to nurse well per mom. No further questions at this time.   Will follow as needed.   Akosua Saez BSN, RN, PHN, RNC-MNN, IBCLC

## 2018-05-30 NOTE — PROVIDER NOTIFICATION
Dr. Rodriguez notified via phone of hemoglobin of 8.8 this am. Patient denies dizziness or fatigue when out of bed. Order received for oral iron daily. Patient updated regarding plan of care.

## 2018-05-30 NOTE — PROGRESS NOTES
[facility]    Obstetrics Post-Op / Progress Note    Assessment & Plan   Assessment:  -1 Day Post-Op  Procedure(s):  REPEAT  SECTION - Wound Class: II-Clean Contaminated    Doing well.  No excessive bleeding  Pain well-controlled.    Plan:  Ambulation encouraged  Hemoglobin in AM  Lactation consultation  Monitor wound for signs of infection  Pain control measures as needed  Reportable signs and symptoms dicussed with the patient    Loreta Rodriguez     Interval History   Doing well.  Pain is well-controlled overnight.  No fevers.  No history of wound drainage, warmth or significant erythema.  Good appetite --no n/v.  Tolerating regular diet.  Denies chest pain, shortness of breath, nausea or vomiting.  Ambulatory.  Breastfeeding well.  No issues overnight    Medications     - MEDICATION INSTRUCTIONS -       dextrose 5% lactated ringers 125 mL/hr at 18 2200     - MEDICATION INSTRUCTIONS -       - MEDICATION INSTRUCTIONS -       - MEDICATION INSTRUCTIONS -       - MEDICATION INSTRUCTIONS -       oxytocin in 0.9% NaCl Stopped (18 1700)     oxytocin in 0.9% NaCl         acetaminophen  975 mg Oral Q8H     sodium chloride (PF)  3 mL Intracatheter Q8H       Physical Exam   Temp: 98.1  F (36.7  C) Temp src: Axillary BP: 122/56 Pulse: 72 Heart Rate: 63 Resp: 16 SpO2: 98 % O2 Device: None (Room air)    Vitals:    18 05   Weight: 63 kg (139 lb)     Vital Signs with Ranges  Temp:  [97.4  F (36.3  C)-98.1  F (36.7  C)] 98.1  F (36.7  C)  Pulse:  [50-75] 72  Heart Rate:  [48-64] 63  Resp:  [11-35] 16  BP: (110-133)/(55-76) 122/56  SpO2:  [97 %-100 %] 98 %  I/O last 3 completed shifts:  In: 3158 [P.O.:400; I.V.:2758]  Out: 3075 [Urine:2275; Blood:800]    Uterine fundus is firm, non-tender and at the level of the umbilicus  Incision C/D/I  Extremities Non-tender; 2+ edema in B feet    Data   Recent Labs   Lab Test  18   0555   ABO  B   RH  Neg   AS  Neg     Recent Labs   Lab Test   05/29/18   0555  04/06/18   1740   HGB  10.4*  12.4     Recent Labs   Lab Test  10/25/17   1407   RUQIGG  18

## 2018-05-30 NOTE — PLAN OF CARE
Problem: Patient Care Overview  Goal: Plan of Care/Patient Progress Review  Outcome: Improving  Patient is up independently, voiding without difficulty, pain has been stable this afternoon. Encouraged to continue to ambulate as able and void frequently. She did have one green-tinged emesis with small amount undigested food at 1630 after feeling queasy. She is passing gas. She received a dose of oral zofran and is feeling improvement related to nausea. She is currently eating and tolerating a light supper. Abdominal binder given and patient states she feels better when wearing it. Waiting to assure she is tolerating food before giving more pain medication, especially because she rates her pain 2/10. Reviewed importance of having good PO intake when taking narcotics. Encouraged to call with needs. Plan of care reviewed with patient.

## 2018-05-31 PROCEDURE — 25000132 ZZH RX MED GY IP 250 OP 250 PS 637: Performed by: OBSTETRICS & GYNECOLOGY

## 2018-05-31 PROCEDURE — 12000037 ZZH R&B POSTPARTUM INTERMEDIATE

## 2018-05-31 RX ADMIN — SENNOSIDES AND DOCUSATE SODIUM 1 TABLET: 8.6; 5 TABLET ORAL at 10:36

## 2018-05-31 RX ADMIN — IBUPROFEN 800 MG: 400 TABLET ORAL at 03:17

## 2018-05-31 RX ADMIN — SENNOSIDES AND DOCUSATE SODIUM 1 TABLET: 8.6; 5 TABLET ORAL at 21:22

## 2018-05-31 RX ADMIN — FERROUS SULFATE TAB 325 MG (65 MG ELEMENTAL FE) 325 MG: 325 (65 FE) TAB at 10:35

## 2018-05-31 RX ADMIN — ACETAMINOPHEN 975 MG: 325 TABLET, FILM COATED ORAL at 06:28

## 2018-05-31 RX ADMIN — ACETAMINOPHEN 975 MG: 325 TABLET, FILM COATED ORAL at 14:05

## 2018-05-31 RX ADMIN — IBUPROFEN 800 MG: 400 TABLET ORAL at 17:04

## 2018-05-31 RX ADMIN — IBUPROFEN 800 MG: 400 TABLET ORAL at 10:35

## 2018-05-31 NOTE — PLAN OF CARE
Problem: Patient Care Overview  Goal: Plan of Care/Patient Progress Review  Outcome: Improving  Patient is up independently, voiding without difficulty, pain well controlled with medications given as prescribed. Vital signs are stable and assessments are within normal limits. She is tolerating a regular diet without nausea and vomiting. Using abdominal binder when up and moving well. Encouraged to continue to ambulate as able and void frequently.

## 2018-05-31 NOTE — PLAN OF CARE
Problem:  Delivery (Adult,Obstetrics,Pediatric)  Goal: Signs and Symptoms of Listed Potential Problems Will be Absent, Minimized or Managed ( Delivery)  Signs and symptoms of listed potential problems will be absent, minimized or managed by discharge/transition of care (reference  Delivery (Adult,Obstetrics,Pediatric) CPG).   Outcome: Improving  Pt tolerating activity well. Pain controlled well with oral pain medication. Independent with infant cares. Up ad susanne independently.  at bedside for support.

## 2018-05-31 NOTE — PROGRESS NOTES
[facility]    Obstetrics Post-Op / Progress Note    Assessment & Plan   Assessment:  -2 Days Post-Op  Procedure(s):  REPEAT  SECTION - Wound Class: II-Clean Contaminated    Doing well.  Clean wound without signs of infection.  No excessive bleeding  Pain well-controlled.    Plan:  Ambulation encouraged  Lactation consultation  Monitor wound for signs of infection  Pain control measures as needed  Anticipate discharge tomorrow    Loreta Rodriguez     Interval History   Doing well.  Pain is well-controlled with oral meds.  No fevers.  No history of wound drainage, warmth or significant erythema.  Good appetite --had one bout of nausea yesterday.  Denies chest pain, shortness of breath, nausea or vomiting.  Ambulatory.  Breastfeeding well.  No issues overnight    Medications     - MEDICATION INSTRUCTIONS -       dextrose 5% lactated ringers 125 mL/hr at 18 2200     - MEDICATION INSTRUCTIONS -       - MEDICATION INSTRUCTIONS -       - MEDICATION INSTRUCTIONS -       oxytocin in 0.9% NaCl Stopped (18 1700)     oxytocin in 0.9% NaCl         acetaminophen  975 mg Oral Q8H     ferrous sulfate  325 mg Oral Daily       Physical Exam   Temp: 98.6  F (37  C) Temp src: Oral BP: 122/69 Pulse: 74 Heart Rate: 68 Resp: 16        Vitals:    1818   Weight: 63 kg (139 lb)     Vital Signs with Ranges  Temp:  [98.3  F (36.8  C)-98.6  F (37  C)] 98.6  F (37  C)  Pulse:  [74] 74  Heart Rate:  [62-68] 68  Resp:  [16] 16  BP: (111-124)/(68-71) 122/69  I/O last 3 completed shifts:  In: -   Out: 1000 [Urine:1000]    Uterine fundus is firm, non-tender and at the level of the umbilicus  Incision C/D/I  Extremities Non-tender    Data   Recent Labs   Lab Test  18   0845  18   0555   ABO  B  B   RH  Neg  Neg   AS   --   Neg     Recent Labs   Lab Test  18   0845  18   0555   HGB  8.8*  10.4*     Recent Labs   Lab Test  10/25/17   1407   RUQIGG  18

## 2018-05-31 NOTE — LACTATION NOTE
Routine visit. Baby able to latch onto the left breast keeps coming off, just a a BM and void also burping.  Mother reports that baby was able to latch on and remain latched for 10-15 minutes at a time for feeds overnight.  No further questions at this time. Will follow as needed. Akosua Saez BSN, RN, PHN, RNC-MNN, IBCLC

## 2018-05-31 NOTE — PLAN OF CARE
Problem: Patient Care Overview  Goal: Plan of Care/Patient Progress Review  Outcome: Therapy, progress toward functional goals as expected  VSS, pt up ad susanne, eating regular diet, pain well controlled with ibuprofen and tylenol, no concerns at this time, will continue to monitor

## 2018-06-01 VITALS
HEART RATE: 79 BPM | TEMPERATURE: 98.6 F | OXYGEN SATURATION: 98 % | RESPIRATION RATE: 16 BRPM | WEIGHT: 139 LBS | DIASTOLIC BLOOD PRESSURE: 70 MMHG | BODY MASS INDEX: 29.18 KG/M2 | HEIGHT: 58 IN | SYSTOLIC BLOOD PRESSURE: 125 MMHG

## 2018-06-01 PROCEDURE — 25000132 ZZH RX MED GY IP 250 OP 250 PS 637: Performed by: OBSTETRICS & GYNECOLOGY

## 2018-06-01 RX ORDER — OXYCODONE HYDROCHLORIDE 5 MG/1
5-10 TABLET ORAL
Qty: 30 TABLET | Refills: 0 | Status: SHIPPED | OUTPATIENT
Start: 2018-06-01 | End: 2018-07-11

## 2018-06-01 RX ORDER — AMOXICILLIN 250 MG
2 CAPSULE ORAL 2 TIMES DAILY PRN
Qty: 100 TABLET | Refills: 1 | Status: SHIPPED | OUTPATIENT
Start: 2018-06-01 | End: 2018-07-11

## 2018-06-01 RX ADMIN — SENNOSIDES AND DOCUSATE SODIUM 1 TABLET: 8.6; 5 TABLET ORAL at 07:38

## 2018-06-01 RX ADMIN — ACETAMINOPHEN 975 MG: 325 TABLET, FILM COATED ORAL at 07:38

## 2018-06-01 RX ADMIN — FERROUS SULFATE TAB 325 MG (65 MG ELEMENTAL FE) 325 MG: 325 (65 FE) TAB at 09:31

## 2018-06-01 RX ADMIN — IBUPROFEN 800 MG: 400 TABLET ORAL at 07:38

## 2018-06-01 NOTE — PLAN OF CARE
Problem: Patient Care Overview  Goal: Plan of Care/Patient Progress Review  Outcome: Improving  VSS. Pain well controlled with ibuprofen, tylenol, refused doses overnight. Up ad susanne independently. Fundus, VB WDL. Encouraged to pump and supplement EBM to infant after feeds due to weight loss. Provided education on pump use, eager to use. Pumped 1 mL colostrum first feed, 20 mL colostrum second feed. Independent in finger feeding. Spouse supportive, at bedside. Will continue to monitor.

## 2018-06-01 NOTE — PROGRESS NOTES
[facility]    Obstetrics Post-Op / Progress Note    Assessment & Plan   Assessment:  -3 Days Post-Op  Procedure(s):  REPEAT  SECTION - Wound Class: II-Clean Contaminated    Doing well.  Clean wound without signs of infection.  No excessive bleeding  Pain well-controlled.    Plan:  Ambulation encouraged  Monitor wound for signs of infection  Pain control measures as needed  Reportable signs and symptoms dicussed with the patient  Discharge later today    Loreta Rodriguez     Interval History   Doing well.  Pain is well-controlled.  No fevers.  No history of wound drainage, warmth or significant erythema.  Good appetite.  Denies chest pain, shortness of breath, nausea or vomiting.  Ambulatory without issues.  Breastfeeding well.  No issues overnight    Medications     - MEDICATION INSTRUCTIONS -       dextrose 5% lactated ringers 125 mL/hr at 18 2200     - MEDICATION INSTRUCTIONS -       - MEDICATION INSTRUCTIONS -       - MEDICATION INSTRUCTIONS -       oxytocin in 0.9% NaCl Stopped (18 1700)     oxytocin in 0.9% NaCl         acetaminophen  975 mg Oral Q8H     ferrous sulfate  325 mg Oral Daily       Physical Exam   Temp: 98.2  F (36.8  C) Temp src: Oral BP: 104/58 Pulse: 79 Heart Rate: 73 Resp: 16        Vitals:    18   Weight: 63 kg (139 lb)     Vital Signs with Ranges  Temp:  [98.2  F (36.8  C)-98.8  F (37.1  C)] 98.2  F (36.8  C)  Pulse:  [79] 79  Heart Rate:  [49-73] 73  Resp:  [16] 16  BP: (104-133)/(58-65) 104/58       Uterine fundus is firm, non-tender and at the level of the umbilicus  Incision C/D/I  Extremities Non-tender    Data   Recent Labs   Lab Test  18   0845  18   0555   ABO  B  B   RH  Neg  Neg   AS   --   Neg     Recent Labs   Lab Test  18   0845  18   0555   HGB  8.8*  10.4*     Recent Labs   Lab Test  10/25/17   1407   RUQIGG  18

## 2018-06-04 NOTE — DISCHARGE SUMMARY
Admit Date:     2018   Discharge Date:     2018      DATE OF ADMISSION:  2018.      DATE OF DISCHARGE:  2018.      HOSPITAL COURSE:  Bhavana is a 22-year-old G2, P2 who was admitted to the postpartum service following a scheduled repeat  section.  Bhavana underwent a repeat  section with delivery of a healthy male infant with a weight of 6 pounds 15 ounces and Apgars of 9 and 9 on the morning hours of 2018 without issue.  Please refer to operative dictation regarding this procedure.  Bhavana's postoperative hospital course was uncomplicated.  Pain was well controlled using a combination of IV as well as oral medications.  She was allowed to advance her diet with return of bowel function and was tolerating regular diet prior to discharge home.  Garcia catheter was discontinued on postoperative day #1, and she was able to void without issues.  Bhavana showed no sign or symptoms of infection throughout postoperative hospital course and remained hemodynamically stable with a postoperative hemoglobin of 8.8 grams per deciliter.  Bhavana was started on oral iron.  Bhavana was discharged to home on postoperative day #3 after meeting all requirements for discharge.  She verbalized understanding of the discharge instructions and was given a list of contact numbers should any issues or problems arise.  Bhavana will follow up with me for routine postpartum visit in 6 weeks.         LEFTY JOSEPH MD             D: 2018   T: 2018   MT: EFE      Name:     BHAVANA BRIDGES   MRN:      [id number]        Account:        [id number]   :      1995           Admit Date:     2018                                  Discharge Date: 2018      Document: [id number]

## 2018-06-15 ENCOUNTER — TELEPHONE (OUTPATIENT)
Dept: NURSING | Facility: CLINIC | Age: 23
End: 2018-06-15

## 2018-06-15 NOTE — TELEPHONE ENCOUNTER
Pt calling to request to have a letter faxed to her employer to set them know that she has delivered and will not be returning to work before 6 weeks.  Pls make it to the attention of Lisa Arce at [phone].  Faxed at [phone]

## 2018-06-15 NOTE — LETTER
[facility]  [address]  [place] [address]                Mile Cobos  [address]   [place] [address]      To Whom It May Concern,      This letter is to notify you that Mile delivered her baby on June 29,2019.  The current plan for her to return to work will take place approximately after July 19th.      If any questions arise, please contact the clinic.            Loreta Rodriguez MD

## 2018-07-11 ENCOUNTER — PRENATAL OFFICE VISIT (OUTPATIENT)
Dept: OBGYN | Facility: CLINIC | Age: 23
End: 2018-07-11
Payer: COMMERCIAL

## 2018-07-11 VITALS
HEART RATE: 64 BPM | BODY MASS INDEX: 26.66 KG/M2 | HEIGHT: 58 IN | SYSTOLIC BLOOD PRESSURE: 100 MMHG | DIASTOLIC BLOOD PRESSURE: 60 MMHG | WEIGHT: 127 LBS

## 2018-07-11 PROCEDURE — 99207 ZZC POST PARTUM EXAM: CPT | Performed by: OBSTETRICS & GYNECOLOGY

## 2018-07-11 ASSESSMENT — ANXIETY QUESTIONNAIRES
1. FEELING NERVOUS, ANXIOUS, OR ON EDGE: NOT AT ALL
IF YOU CHECKED OFF ANY PROBLEMS ON THIS QUESTIONNAIRE, HOW DIFFICULT HAVE THESE PROBLEMS MADE IT FOR YOU TO DO YOUR WORK, TAKE CARE OF THINGS AT HOME, OR GET ALONG WITH OTHER PEOPLE: NOT DIFFICULT AT ALL
6. BECOMING EASILY ANNOYED OR IRRITABLE: NOT AT ALL
GAD7 TOTAL SCORE: 0
5. BEING SO RESTLESS THAT IT IS HARD TO SIT STILL: NOT AT ALL
2. NOT BEING ABLE TO STOP OR CONTROL WORRYING: NOT AT ALL
7. FEELING AFRAID AS IF SOMETHING AWFUL MIGHT HAPPEN: NOT AT ALL
3. WORRYING TOO MUCH ABOUT DIFFERENT THINGS: NOT AT ALL

## 2018-07-11 ASSESSMENT — PATIENT HEALTH QUESTIONNAIRE - PHQ9: 5. POOR APPETITE OR OVEREATING: NOT AT ALL

## 2018-07-11 NOTE — LETTER
[facility]  [address] [address]  [phone]          July 11, 2018    RE:  Mile Cobos                                                                                                                                                       [address]   [place] [address]            To whom it may concern:    Mile Cobos is under my professional care for pregnancy. She  may return to work with the following: The employee is ABLE to return to work on Friday July 13, 2018 without restrictions  .      Sincerely,        Adrianna Rciardo MD

## 2018-07-11 NOTE — PROGRESS NOTES
"  SUBJECTIVE:  Mile Cobos,  is here for a postpartum visit.  She had a  Section  on 18 delivering a healthy baby boy, named Abiodun weighing 6 lbs 15.3 oz at term.      HPI:  Pp 6 wks from c section  Will return for Mirena IUD insertion  Ok to return to work this , letter given  No concerns    Last PHQ-9 score on record=   PHQ-9 SCORE 2018   Total Score 0     SANIA-7 SCORE 10/25/2017 2018   Total Score 1 0       Delivery complications:  No  Breast feeding:  Yes,   Bladder problems:  No  Bowel problems/hemorrhoids:  No  Episiotomy/laceration/incision healed? Yes:   Vaginal flow:  Yellow discharge  [place]:  No  Contraception: IUD  Emotional adjustment:  doing well and happy  Back to work:  18 Will need a note to go back to work.     12 point review of systems negative other than symptoms noted below.  Genitourinary: Vaginal Discharge    OBJECTIVE:  Vitals: /60  Pulse 64  Ht 4' 9.75\" (1.467 m)  Wt 127 lb (57.6 kg)  LMP 2017 (Exact Date)  Breastfeeding? Yes  BMI 26.77 kg/m2  BMI= Body mass index is 26.77 kg/(m^2).  General - pleasant female in no acute distress.  Breast -  deferred  Abdomen - Incision well-healed    Rectovaginal - deferred.    ASSESSMENT:    ICD-10-CM    1. Routine postpartum follow-up Z39.2        PLAN:  May resume normal activities without restrictions.  Pap smear was not done today.    Schedule MIrena IUD insertion 2 wks   Was not scheduled today    Adrianna Ricardo MD      "

## 2018-07-11 NOTE — MR AVS SNAPSHOT
"              After Visit Summary   2018    Mile Cobos    MRN: [id number]           Patient Information     Date Of Birth          1995        Visit Information        Provider Department      2018 12:10 PM Adrianna Ricardo MD [facility]        Today's Diagnoses     Routine postpartum follow-up    -  1       Follow-ups after your visit        Who to contact     If you have questions or need follow up information about today's clinic visit or your schedule please contact [facility] directly at [phone].  Normal or non-critical lab and imaging results will be communicated to you by expressor softwarehart, letter or phone within 4 business days after the clinic has received the results. If you do not hear from us within 7 days, please contact the clinic through expressor softwarehart or phone. If you have a critical or abnormal lab result, we will notify you by phone as soon as possible.  Submit refill requests through Milk or call your pharmacy and they will forward the refill request to us. Please allow 3 business days for your refill to be completed.          Additional Information About Your Visit        MyChart Information     Milk lets you send messages to your doctor, view your test results, renew your prescriptions, schedule appointments and more. To sign up, go to [email or web address] . Click on \"Log in\" on the left side of the screen, which will take you to the Welcome page. Then click on \"Sign up Now\" on the right side of the page.     You will be asked to enter the access code listed below, as well as some personal information. Please follow the directions to create your username and password.     Your access code is: [email or web address]  Expires: 10/9/2018 11:49 AM     Your access code will  in 90 days. If you need help or a new code, please call your [place] clinic or [phone].        Care EveryWhere ID     This is your Care EveryWhere ID. This could be " "used by other organizations to access your [place] medical records  [id number]        Your Vitals Were     Pulse Height Last Period Breastfeeding? BMI (Body Mass Index)       64 4' 9.75\" (1.467 m) 08/13/2017 (Exact Date) Yes 26.77 kg/m2        Blood Pressure from Last 3 Encounters:   07/11/18 100/60   06/01/18 125/70   05/22/18 104/70    Weight from Last 3 Encounters:   07/11/18 127 lb (57.6 kg)   05/29/18 139 lb (63 kg)   05/22/18 139 lb (63 kg)              Today, you had the following     No orders found for display       Primary Care Provider Office Phone # Fax #    [facility] [phone]       [place] [address]  [facility] [address]  [facility] [address]        Equal Access to Services     HEMANT PATEL : Hadii aad ku hadasho Sosammy, waaxda luqadaha, qaybta kaalmada adeegyada, ralph bravo . So [facility] [phone].    ATENCIÓN: Si habla español, tiene a monreal disposición servicios gratuitos de asistencia lingüística. Silverio al [phone].    We comply with applicable federal civil rights laws and [place] laws. We do not discriminate on the basis of race, color, national origin, age, disability, sex, sexual orientation, or gender identity.            Thank you!     Thank you for choosing [facility]  for your care. Our goal is always to provide you with excellent care. Hearing back from our patients is one way we can continue to improve our services. Please take a few minutes to complete the written survey that you may receive in the mail after your visit with us. Thank you!             Your Updated Medication List - Protect others around you: Learn how to safely use, store and throw away your medicines at www.disposemymeds.org.          This list is accurate as of 7/11/18 12:13 PM.  Always use your most recent med list.                   Brand Name Dispense Instructions for use Diagnosis    fish " oil-omega-3 fatty acids 1000 MG capsule      Take 2 g by mouth daily        IRON SUPPLEMENT PO      Take 27 mg by mouth        prenatal multivitamin plus iron 27-0.8 MG Tabs per tablet      Take 2 tablets by mouth daily

## 2018-07-12 ASSESSMENT — PATIENT HEALTH QUESTIONNAIRE - PHQ9: SUM OF ALL RESPONSES TO PHQ QUESTIONS 1-9: 0

## 2018-07-12 ASSESSMENT — ANXIETY QUESTIONNAIRES: GAD7 TOTAL SCORE: 0

## 2018-08-06 ENCOUNTER — OFFICE VISIT (OUTPATIENT)
Dept: OBGYN | Facility: CLINIC | Age: 23
End: 2018-08-06
Payer: COMMERCIAL

## 2018-08-06 VITALS
SYSTOLIC BLOOD PRESSURE: 116 MMHG | HEART RATE: 60 BPM | HEIGHT: 58 IN | BODY MASS INDEX: 26.28 KG/M2 | WEIGHT: 125.2 LBS | DIASTOLIC BLOOD PRESSURE: 70 MMHG

## 2018-08-06 DIAGNOSIS — Z01.812 PRE-PROCEDURAL LABORATORY EXAMINATION: ICD-10-CM

## 2018-08-06 DIAGNOSIS — Z30.430 ENCOUNTER FOR INSERTION OF INTRAUTERINE CONTRACEPTIVE DEVICE: Primary | ICD-10-CM

## 2018-08-06 PROBLEM — O09.90 SUPERVISION OF HIGH-RISK PREGNANCY: Status: RESOLVED | Noted: 2017-10-25 | Resolved: 2018-08-06

## 2018-08-06 LAB — BETA HCG QUAL IFA URINE: NEGATIVE

## 2018-08-06 PROCEDURE — 58300 INSERT INTRAUTERINE DEVICE: CPT | Performed by: OBSTETRICS & GYNECOLOGY

## 2018-08-06 PROCEDURE — 84703 CHORIONIC GONADOTROPIN ASSAY: CPT | Performed by: OBSTETRICS & GYNECOLOGY

## 2018-08-06 NOTE — PROGRESS NOTES
IUD Insertion:  CONSULT:    Subjective: Mile Cobos is a 23 year old  presents for IUD and desires Mirena type IUD.    Patient has been given the opportunity to ask questions about all forms of birth control, including all options appropriate for Mile Cobos. Discussed that no method of birth control, except abstinence is 100% effective against pregnancy or sexually transmitted infection.     Mile Cobos understands she may have the IUD removed at any time. IUD should be removed by a health care provider.    The entire insertion procedure was reviewed with the patient, including care after placement.    Patient's last menstrual period was 2017 (exact date). Last sexual activity: 2 weeks ago. No allergy to betadine or shellfish. Patient declines STD screening  No results found for: HCG  Was negative today    Written consent obtained by the patient and scanned into the medical record. yes    LMP 2017 (Exact Date)    Pelvic Exam:   EG/BUS: normal genital architecture without lesions, erythema or abnormal secretions.   Vagina: moist, pink, rugae with physiologic discharge and secretions  Cervix: multiparous no lesions and pink, moist, closed, without lesion or CMT  Uterus: anteverted position, mobile, no pain  Adnexa: within normal limits and no masses, nodularity, tenderness    PROCEDURE NOTE: -- IUD Insertion    Reason for Insertion: contraception    none  Under sterile technique, cervix was visualized with speculum and prepped with Betadine solution swab x 3. Tenaculum was placed for stability. The uterus was gently straightened and sounded to 9.0 cm. IUD prepared for placement, and IUD inserted according to 's instructions without difficulty or significant resitance, and deployed at the fundus. The strings were visualized and trimmed to 4.0 cm from the external os. Tenaculum was removed and hemostasis noted. Speculum removed.  Patient tolerated procedure  well.    EBL: minimal    Complications: none    ASSESSMENT:     ICD-10-CM    1. Encounter for insertion of intrauterine contraceptive device Z30.430 HC LEVONORGESTREL IU 52MG 5 YR     levonorgestrel (MIRENA) 20 MCG/24HR IUD     INSERTION INTRAUTERINE DEVICE        PLAN:    Given 's handouts, including when to have IUD removed, list of danger s/sx, side effects and follow up recommended. Encouraged condom use for prevention of STD. Back up contraception advised for 7 days if progestin method. Advised to call for any fever, for prolonged or severe pain or bleeding, abnormal vaginal discharge, or unable to palpate strings. She was advised to use pain medications (ibuprofen) as needed for mild to moderate pain. Advised to follow-up in clinic in 4-6 weeks for IUD string check if unable to find strings or as directed by provider.     Adrianna Ricardo MD

## 2018-08-06 NOTE — MR AVS SNAPSHOT
"              After Visit Summary   2018    Mile Cobos    MRN: [id number]           Patient Information     Date Of Birth          1995        Visit Information        Provider Department      2018 11:30 AM Adrianna Ricardo MD [facility]        Today's Diagnoses     Encounter for insertion of intrauterine contraceptive device    -  1    Pre-procedural laboratory examination           Follow-ups after your visit        Who to contact     If you have questions or need follow up information about today's clinic visit or your schedule please contact [facility] directly at [phone].  Normal or non-critical lab and imaging results will be communicated to you by Pictelahart, letter or phone within 4 business days after the clinic has received the results. If you do not hear from us within 7 days, please contact the clinic through Pictelahart or phone. If you have a critical or abnormal lab result, we will notify you by phone as soon as possible.  Submit refill requests through Minefold or call your pharmacy and they will forward the refill request to us. Please allow 3 business days for your refill to be completed.          Additional Information About Your Visit        MyChart Information     Minefold lets you send messages to your doctor, view your test results, renew your prescriptions, schedule appointments and more. To sign up, go to [email or web address] . Click on \"Log in\" on the left side of the screen, which will take you to the Welcome page. Then click on \"Sign up Now\" on the right side of the page.     You will be asked to enter the access code listed below, as well as some personal information. Please follow the directions to create your username and password.     Your access code is: [email or web address]  Expires: 10/9/2018 11:49 AM     Your access code will  in 90 days. If you need help or a new code, please call your [place] clinic or [phone].   " "     Care EveryWhere ID     This is your Care EveryWhere ID. This could be used by other organizations to access your [place] medical records  [id number]        Your Vitals Were     Pulse Height Last Period Breastfeeding? BMI (Body Mass Index)       60 4' 9.75\" (1.467 m) 08/13/2017 (Exact Date) No 26.39 kg/m2        Blood Pressure from Last 3 Encounters:   08/06/18 116/70   07/11/18 100/60   06/01/18 125/70    Weight from Last 3 Encounters:   08/06/18 125 lb 3.2 oz (56.8 kg)   07/11/18 127 lb (57.6 kg)   05/29/18 139 lb (63 kg)              We Performed the Following     Beta HCG Qual, Urine - [facility] and [place] ([id number])     HC LEVONORGESTREL IU 52MG 5 YR     INSERTION INTRAUTERINE DEVICE          Today's Medication Changes          These changes are accurate as of 8/6/18 11:54 AM.  If you have any questions, ask your nurse or doctor.               Start taking these medicines.        Dose/Directions    levonorgestrel 20 MCG/24HR IUD   Commonly known as:  MIRENA   Used for:  Encounter for insertion of intrauterine contraceptive device   Started by:  Adrianna Ricardo MD        Dose:  1 each   1 each (20 mcg) by Intrauterine route continuous   Refills:  0            Where to get your medicines      Some of these will need a paper prescription and others can be bought over the counter.  Ask your nurse if you have questions.     You don't need a prescription for these medications     levonorgestrel 20 MCG/24HR IUD                Primary Care Provider Office Phone # Fax #    [facility] [phone]       [facility] [address]  [facility] [address]  [facility] [address]        Equal Access to Services     HEMANT PATEL AH: Hadii estelle Guerrier, waaxda luqadaha, qaybta kaalmada adeegyada, ralph beckett. So [facility] [phone].    ATENCIÓN: Si habla español, tiene a monreal disposición servicios gratuitos de asistencia lingüística. " Silverio diego [phone].    We comply with applicable federal civil rights laws and [place] laws. We do not discriminate on the basis of race, color, national origin, age, disability, sex, sexual orientation, or gender identity.            Thank you!     Thank you for choosing [facility]  for your care. Our goal is always to provide you with excellent care. Hearing back from our patients is one way we can continue to improve our services. Please take a few minutes to complete the written survey that you may receive in the mail after your visit with us. Thank you!             Your Updated Medication List - Protect others around you: Learn how to safely use, store and throw away your medicines at www.disposemymeds.org.          This list is accurate as of 8/6/18 11:54 AM.  Always use your most recent med list.                   Brand Name Dispense Instructions for use Diagnosis    levonorgestrel 20 MCG/24HR IUD    MIRENA     1 each (20 mcg) by Intrauterine route continuous    Encounter for insertion of intrauterine contraceptive device

## 2018-08-22 ENCOUNTER — OFFICE VISIT (OUTPATIENT)
Dept: OBGYN | Facility: CLINIC | Age: 23
End: 2018-08-22
Payer: COMMERCIAL

## 2018-08-22 DIAGNOSIS — Z30.430 ENCOUNTER FOR INSERTION OF INTRAUTERINE CONTRACEPTIVE DEVICE: Primary | ICD-10-CM

## 2018-08-22 PROCEDURE — 58300 INSERT INTRAUTERINE DEVICE: CPT | Performed by: OBSTETRICS & GYNECOLOGY

## 2018-08-22 NOTE — MR AVS SNAPSHOT
"              After Visit Summary   2018    Mile Cobos    MRN: [id number]           Patient Information     Date Of Birth          1995        Visit Information        Provider Department      2018 11:20 AM Adri[facility] MD [facility]        Today's Diagnoses     Encounter for insertion of intrauterine contraceptive device    -  1       Follow-ups after your visit        Who to contact     If you have questions or need follow up information about today's clinic visit or your schedule please contact [facility] directly at [phone].  Normal or non-critical lab and imaging results will be communicated to you by Bozukohart, letter or phone within 4 business days after the clinic has received the results. If you do not hear from us within 7 days, please contact the clinic through Bozukohart or phone. If you have a critical or abnormal lab result, we will notify you by phone as soon as possible.  Submit refill requests through [employer] or call your pharmacy and they will forward the refill request to us. Please allow 3 business days for your refill to be completed.          Additional Information About Your Visit        MyChart Information     [employer] lets you send messages to your doctor, view your test results, renew your prescriptions, schedule appointments and more. To sign up, go to [email or web address] . Click on \"Log in\" on the left side of the screen, which will take you to the Welcome page. Then click on \"Sign up Now\" on the right side of the page.     You will be asked to enter the access code listed below, as well as some personal information. Please follow the directions to create your username and password.     Your access code is: [email or web address]  Expires: 10/9/2018 11:49 AM     Your access code will  in 90 days. If you need help or a new code, please call your [place] clinic or [phone].        Care EveryWhere ID     This is your " Care EveryWhere ID. This could be used by other organizations to access your [place] medical records  [id number]         Blood Pressure from Last 3 Encounters:   08/06/18 116/70   07/11/18 100/60   06/01/18 125/70    Weight from Last 3 Encounters:   08/06/18 125 lb 3.2 oz (56.8 kg)   07/11/18 127 lb (57.6 kg)   05/29/18 139 lb (63 kg)              We Performed the Following     HC LEVONORGESTREL IU 52MG 5 YR     INSERTION INTRAUTERINE DEVICE          Today's Medication Changes          These changes are accurate as of 8/22/18 11:43 AM.  If you have any questions, ask your nurse or doctor.               These medicines have changed or have updated prescriptions.        Dose/Directions    * levonorgestrel 20 MCG/24HR IUD   Commonly known as:  MIRENA   This may have changed:  Another medication with the same name was added. Make sure you understand how and when to take each.   Used for:  Encounter for insertion of intrauterine contraceptive device        Dose:  1 each   1 each (20 mcg) by Intrauterine route continuous   Refills:  0       * levonorgestrel 20 MCG/24HR IUD   Commonly known as:  MIRENA   This may have changed:  You were already taking a medication with the same name, and this prescription was added. Make sure you understand how and when to take each.   Used for:  Encounter for insertion of intrauterine contraceptive device        Dose:  1 each   1 each (20 mcg) by Intrauterine route continuous Lot [id number], Exp 02/2021 S/N [id number]   Refills:  0       * Notice:  This list has 2 medication(s) that are the same as other medications prescribed for you. Read the directions carefully, and ask your doctor or other care provider to review them with you.         Where to get your medicines      Some of these will need a paper prescription and others can be bought over the counter.  Ask your nurse if you have questions.     You don't need a prescription for these medications     levonorgestrel 20  MCG/24HR IUD                Primary Care Provider Office Phone # Fax #    [facility] [phone]       [facility] [address]  [facility] [address]        Equal Access to Services     HEMANT PATEL : Hadii aad ku hadasho Soomaali, waaxda luqadaha, qaybta kaalmada adeegyada, waxay nishain haynahomyn lan ajannayaz beckett. So [facility] [phone].    ATENCIÓN: Si habla español, tiene a monreal disposición servicios gratuitos de asistencia lingüística. Llame al [phone].    We comply with applicable federal civil rights laws and [place] laws. We do not discriminate on the basis of race, color, national origin, age, disability, sex, sexual orientation, or gender identity.            Thank you!     Thank you for choosing [facility]  for your care. Our goal is always to provide you with excellent care. Hearing back from our patients is one way we can continue to improve our services. Please take a few minutes to complete the written survey that you may receive in the mail after your visit with us. Thank you!             Your Updated Medication List - Protect others around you: Learn how to safely use, store and throw away your medicines at www.disposemymeds.org.          This list is accurate as of 8/22/18 11:43 AM.  Always use your most recent med list.                   Brand Name Dispense Instructions for use Diagnosis    * levonorgestrel 20 MCG/24HR IUD    MIRENA     1 each (20 mcg) by Intrauterine route continuous    Encounter for insertion of intrauterine contraceptive device       * levonorgestrel 20 MCG/24HR IUD    MIRENA     1 each (20 mcg) by Intrauterine route continuous Lot [id number], Exp 02/2021 S/N [id number]    Encounter for insertion of intrauterine contraceptive device       * Notice:  This list has 2 medication(s) that are the same as other medications prescribed for you. Read the directions carefully, and  ask your doctor or other care provider to review them with you.

## 2018-08-22 NOTE — PROGRESS NOTES
IUD Insertion:  CONSULT:    Subjective: Mile Cobos is a 23 year old  presents for IUD and desires Mirena type IUD.    Patient has been given the opportunity to ask questions about all forms of birth control, including all options appropriate for Mile Cobos. Discussed that no method of birth control, except abstinence is 100% effective against pregnancy or sexually transmitted infection.     Mile Cobos understands she may have the IUD removed at any time. IUD should be removed by a health care provider.    The entire insertion procedure was reviewed with the patient, including care after placement.    No LMP recorded. Has current contraception. No allergy to betadine or shellfish. Patient declines STD screening  No results found for: HCG    Written consent obtained by the patient and scanned into the medical record.   Prior iud pulled out with tampon    Mirena   Lot [id number], Exp 2021  S/N [id number]    There were no vitals taken for this visit.    Pelvic Exam:   EG/BUS: normal genital architecture without lesions, erythema or abnormal secretions.   Vagina: moist, pink, rugae with physiologic discharge and secretions  Cervix: multiparous no lesions and pink, moist, closed, without lesion or CMT  Uterus:  Anterior position, mobile, no pain  Adnexa: within normal limits and no masses, nodularity, tenderness    PROCEDURE NOTE: -- IUD Insertion    Reason for Insertion: contraception    none  Under sterile technique, cervix was visualized with speculum and prepped with Betadine solution swab x 3. Tenaculum was placed for stability. The uterus was gently straightened and sounded to 8.0 cm. IUD prepared for placement, and IUD inserted according to 's instructions without difficulty or significant resitance, and deployed at the fundus. The strings were visualized and trimmed to 3.0 cm from the external os. Tenaculum was removed and hemostasis noted. Speculum removed.   Patient tolerated procedure well.    EBL: minimal    Complications: none    ASSESSMENT:     ICD-10-CM    1. Encounter for insertion of intrauterine contraceptive device Z30.430         PLAN:    Given 's handouts, including when to have IUD removed, list of danger s/sx, side effects and follow up recommended. Encouraged condom use for prevention of STD. Back up contraception advised for 7 days if progestin method. Advised to call for any fever, for prolonged or severe pain or bleeding, abnormal vaginal discharge, or unable to palpate strings. She was advised to use pain medications (ibuprofen) as needed for mild to moderate pain. Advised to follow-up in clinic in 4-6 weeks for IUD string check if unable to find strings or as directed by provider.     Adrianna Ricardo MD

## 2021-10-18 NOTE — PROGRESS NOTES
Doing well today.  No issues/concerns  +FM  Occ ctx; no cramping/vb/lof  Some LE swelling --more in right foot; will try compression stockings  RTC 2wks  
Yes

## 2022-04-21 ENCOUNTER — LAB REQUISITION (OUTPATIENT)
Dept: LAB | Facility: CLINIC | Age: 27
End: 2022-04-21

## 2022-04-21 PROCEDURE — 86481 TB AG RESPONSE T-CELL SUSP: CPT | Performed by: INTERNAL MEDICINE

## 2022-04-24 LAB
GAMMA INTERFERON BACKGROUND BLD IA-ACNC: 0.18 IU/ML
M TB IFN-G BLD-IMP: NEGATIVE
M TB IFN-G CD4+ BCKGRND COR BLD-ACNC: 9.82 IU/ML
MITOGEN IGNF BCKGRD COR BLD-ACNC: -0.01 IU/ML
MITOGEN IGNF BCKGRD COR BLD-ACNC: -0.03 IU/ML
QUANTIFERON MITOGEN: 10 IU/ML
QUANTIFERON NIL TUBE: 0.18 IU/ML
QUANTIFERON TB1 TUBE: 0.15 IU/ML
QUANTIFERON TB2 TUBE: 0.17

## 2022-05-15 ENCOUNTER — HEALTH MAINTENANCE LETTER (OUTPATIENT)
Age: 27
End: 2022-05-15

## 2022-05-19 ENCOUNTER — LAB REQUISITION (OUTPATIENT)
Dept: LAB | Facility: CLINIC | Age: 27
End: 2022-05-19

## 2022-05-19 PROCEDURE — U0005 INFEC AGEN DETEC AMPLI PROBE: HCPCS | Performed by: INTERNAL MEDICINE

## 2022-05-20 LAB — SARS-COV-2 RNA RESP QL NAA+PROBE: NEGATIVE

## 2022-07-20 ENCOUNTER — LAB REQUISITION (OUTPATIENT)
Dept: LAB | Facility: CLINIC | Age: 27
End: 2022-07-20

## 2022-07-20 PROCEDURE — U0003 INFECTIOUS AGENT DETECTION BY NUCLEIC ACID (DNA OR RNA); SEVERE ACUTE RESPIRATORY SYNDROME CORONAVIRUS 2 (SARS-COV-2) (CORONAVIRUS DISEASE [COVID-19]), AMPLIFIED PROBE TECHNIQUE, MAKING USE OF HIGH THROUGHPUT TECHNOLOGIES AS DESCRIBED BY CMS-2020-01-R: HCPCS | Performed by: INTERNAL MEDICINE

## 2022-07-21 LAB — SARS-COV-2 RNA RESP QL NAA+PROBE: NEGATIVE

## 2022-09-10 ENCOUNTER — HEALTH MAINTENANCE LETTER (OUTPATIENT)
Age: 27
End: 2022-09-10

## 2023-02-17 NOTE — PLAN OF CARE
Problem: Patient Care Overview  Goal: Plan of Care/Patient Progress Review  Outcome: Improving  Pt is looking forward to discharge to home.  Continues to monitor Pt.       In the setting of her diabetes. A1c now within normal range.  - BMP weekly  - remains at baseline SCr. CKD 3A  - d/c metformin on discharge

## 2023-06-03 ENCOUNTER — HEALTH MAINTENANCE LETTER (OUTPATIENT)
Age: 28
End: 2023-06-03

## 2023-12-19 NOTE — ANESTHESIA POSTPROCEDURE EVALUATION
Patient is a 63y old  Female who presents with a chief complaint of right knee pain--for right TKA (19 Dec 2023 08:06)      INTERVAL HPI/OVERNIGHT EVENTS:   pt reports feeling tired, also reported discomfort in her right calf.     MEDICATIONS  (STANDING):  acetaminophen     Tablet .. 1000 milliGRAM(s) Oral every 8 hours  aspirin enteric coated 81 milliGRAM(s) Oral every 12 hours  DULoxetine 60 milliGRAM(s) Oral two times a day  lactated ringers. 1000 milliLiter(s) (125 mL/Hr) IV Continuous <Continuous>  loratadine 10 milliGRAM(s) Oral daily  melatonin 3 milliGRAM(s) Oral at bedtime  meloxicam 15 milliGRAM(s) Oral daily  montelukast 10 milliGRAM(s) Oral daily  pantoprazole    Tablet 40 milliGRAM(s) Oral before breakfast  polyethylene glycol 3350 17 Gram(s) Oral at bedtime  senna 2 Tablet(s) Oral at bedtime    MEDICATIONS  (PRN):  aluminum hydroxide/magnesium hydroxide/simethicone Suspension 30 milliLiter(s) Oral four times a day PRN Indigestion  HYDROmorphone  Injectable 0.5 milliGRAM(s) IV Push every 3 hours PRN Breakthrough pain  magnesium hydroxide Suspension 30 milliLiter(s) Oral daily PRN Constipation  ondansetron Injectable 4 milliGRAM(s) IV Push every 6 hours PRN Nausea and/or Vomiting  oxyCODONE    IR 10 milliGRAM(s) Oral every 3 hours PRN Severe Pain (7 - 10)  oxyCODONE    IR 5 milliGRAM(s) Oral every 3 hours PRN Moderate Pain (4 - 6)      Allergies  No Known Allergies    REVIEW OF SYSTEMS:  CONSTITUTIONAL: No fever, weight loss, or fatigue  EYES: No eye pain, visual disturbances, or discharge  ENMT:  No difficulty hearing, tinnitus, vertigo; No sinus or throat pain  NECK: No pain or stiffness  BREASTS: No pain, masses, or nipple discharge  RESPIRATORY: No cough, wheezing, chills or hemoptysis; No shortness of breath  CARDIOVASCULAR: No chest pain, palpitations, or lightheadedness  GASTROINTESTINAL: No abdominal or epigastric pain. No nausea, vomiting, or hematemesis; No diarrhea or constipation. No melena or hematochezia.  GENITOURINARY: No dysuria, frequency, hematuria, or incontinence  NEUROLOGICAL: No headaches, vertigo, memory loss, loss of strength, numbness, or tremors  SKIN: No itching, burning, rashes, or lesions   LYMPH NODES: No enlarged glands  ENDOCRINE: No heat or cold intolerance; No hair loss; No polydipsia or polyuria  MUSCULOSKELETAL: No back pain  PSYCHIATRIC: No depression, anxiety, or mood swings  HEME/LYMPH: No easy bruising, or bleeding gums  ALLERGY AND IMMUNOLOGIC: No hives or eczema    Vital Signs Last 24 Hrs  T(C): 37 (19 Dec 2023 07:36), Max: 37 (18 Dec 2023 15:40)  T(F): 98.6 (19 Dec 2023 07:36), Max: 98.6 (18 Dec 2023 15:40)  HR: 63 (19 Dec 2023 07:36) (59 - 84)  BP: 115/71 (19 Dec 2023 07:36) (96/62 - 116/69)  BP(mean): 73 (18 Dec 2023 19:35) (73 - 73)  RR: 16 (19 Dec 2023 07:36) (13 - 20)  SpO2: 96% (19 Dec 2023 07:36) (94% - 100%)    Parameters below as of 19 Dec 2023 07:36  Patient On (Oxygen Delivery Method): room air        PHYSICAL EXAM:  GENERAL: NAD, well-groomed, well-developed  HEAD:  Atraumatic, Normocephalic  EYES:  conjunctiva and sclera clear  ENMT: Moist mucous membranes  NECK: Supple, No JVD  NERVOUS SYSTEM:  Alert & Oriented X3, Good concentration; Bilateral LE mobile, sensation to light touch intact  CHEST/LUNG: Clear to auscultation bilaterally;    HEART: Regular rate and rhythm;    ABDOMEN: Soft, Nontender, Nondistended; Bowel sounds present  EXTREMITIES:  2+ Peripheral Pulses, No clubbing or cyanosis; + calf tenderness on the right  INCISION:  Dressing dry and intact    LABS:                        10.1   10.52 )-----------( 233      ( 19 Dec 2023 06:00 )             31.6     19 Dec 2023 06:00    141    |  105    |  14     ----------------------------<  135    4.1     |  28     |  0.67     Ca    9.2        19 Dec 2023 06:00        Urinalysis Basic - ( 19 Dec 2023 06:00 )    Color: x / Appearance: x / SG: x / pH: x  Gluc: 135 mg/dL / Ketone: x  / Bili: x / Urobili: x   Blood: x / Protein: x / Nitrite: x   Leuk Esterase: x / RBC: x / WBC x   Sq Epi: x / Non Sq Epi: x / Bacteria: x      CAPILLARY BLOOD GLUCOSE          RADIOLOGY & ADDITIONAL TESTS:    Imaging Personally Reviewed:      [ ] Consultant(s) Notes Reviewed  [x] Care Discussed with Consultants/Other Providers:  Ortho PA- plan of care Patient: Mile Cobos    Procedure(s):  REPEAT  SECTION - Wound Class: II-Clean Contaminated    Diagnosis:PRIOR  SECTION, DECLINES TRIAL OF LABOR  Diagnosis Additional Information: No value filed.    Anesthesia Type:  Spinal    Note:  Anesthesia Post Evaluation    Patient location during evaluation: PACU  Patient participation: Able to fully participate in evaluation  Level of consciousness: awake and alert  Pain management: adequate  Airway patency: patent  Cardiovascular status: acceptable and hemodynamically stable  Respiratory status: acceptable and unassisted  Hydration status: acceptable  PONV: none     Anesthetic complications: None          Last vitals:  Vitals:    18 1000 18 1015 18 1045   BP: 129/75 123/75 133/72   Pulse:   52   Resp: 15 11 16   Temp:   36.7  C (98  F)   SpO2: 98% 98% 98%         Electronically Signed By: Christiano Cruz MD  May 29, 2018  11:12 AM

## 2024-04-11 ENCOUNTER — OFFICE VISIT (OUTPATIENT)
Dept: URGENT CARE | Facility: URGENT CARE | Age: 29
End: 2024-04-11
Payer: COMMERCIAL

## 2024-04-11 VITALS
HEART RATE: 89 BPM | OXYGEN SATURATION: 99 % | BODY MASS INDEX: 28.16 KG/M2 | TEMPERATURE: 98.8 F | SYSTOLIC BLOOD PRESSURE: 110 MMHG | DIASTOLIC BLOOD PRESSURE: 75 MMHG | WEIGHT: 133.6 LBS | RESPIRATION RATE: 14 BRPM

## 2024-04-11 DIAGNOSIS — R50.9 FEVER AND CHILLS: ICD-10-CM

## 2024-04-11 DIAGNOSIS — J02.0 STREP PHARYNGITIS: Primary | ICD-10-CM

## 2024-04-11 LAB
DEPRECATED S PYO AG THROAT QL EIA: POSITIVE
FLUAV AG SPEC QL IA: NEGATIVE
FLUBV AG SPEC QL IA: NEGATIVE

## 2024-04-11 PROCEDURE — 99203 OFFICE O/P NEW LOW 30 MIN: CPT | Performed by: PHYSICIAN ASSISTANT

## 2024-04-11 PROCEDURE — 87804 INFLUENZA ASSAY W/OPTIC: CPT | Performed by: PHYSICIAN ASSISTANT

## 2024-04-11 PROCEDURE — 87880 STREP A ASSAY W/OPTIC: CPT | Performed by: PHYSICIAN ASSISTANT

## 2024-04-11 RX ORDER — AMOXICILLIN 500 MG/1
500 CAPSULE ORAL 2 TIMES DAILY
Qty: 20 CAPSULE | Refills: 0 | Status: SHIPPED | OUTPATIENT
Start: 2024-04-11 | End: 2024-04-21

## 2024-04-11 NOTE — PROGRESS NOTES
Strep pharyngitis  - Streptococcus A Rapid Screen w/Reflex to PCR - Clinic Collect  - Influenza A & B Antigen - Clinic Collect  - amoxicillin (AMOXIL) 500 MG capsule; Take 1 capsule (500 mg) by mouth 2 times daily for 10 days    Fever and chills  - Influenza A & B Antigen - Clinic Collect  - amoxicillin (AMOXIL) 500 MG capsule; Take 1 capsule (500 mg) by mouth 2 times daily for 10 days      General Tips for All Ages:    Rest and Hydration:  Allow yourself the time to rest and prioritize hydration.  Stay well-hydrated with water, clear broths, and soothing herbal teas.    Symptomatic Relief:  Over-the-counter (OTC) medications can help alleviate symptoms.  Consider non-pharmacological options like a warm saltwater gargle for soothing a sore throat.    For Infants and Children (Under 2 Years):    Nasal Saline Drops:  Use saline drops to clear nasal congestion in infants.  Administer 1-2 drops in each nostril before feeding or bedtime.    Humidifier:  Place a cool-mist humidifier in the room to ease congestion.  Remember to clean the humidifier regularly.  Okay to use [employer]     Acetaminophen (if applicable):  Use acetaminophen for fever and discomfort.  Follow dosing guidelines based on your child's weight.  Avoid aspirin in children to prevent Reye's syndrome.    Contact Pediatrician:  If symptoms persist or worsen, or if your child shows signs of respiratory distress, contact your pediatrician.    For Children (2-12 Years):    Nasal Saline Solution:  Encourage the use of saline nasal spray for congestion relief.  Teach your child to blow their nose gently.    Honey (for those over 1 year):  Use honey to soothe a cough (1-2 teaspoons as needed).  Do not give honey to children under 1 year due to the risk of botulism.    Acetaminophen or Ibuprofen:  Use acetaminophen or ibuprofen for fever and pain relief.  Follow dosing guidelines based on your child's weight.    Fluid Intake:  Ensure your child drinks warm  liquids like herbal teas and broths.  Monitor their fluid intake to keep them hydrated.    For Adolescents and Adults (12 Years and Older):    Decongestants (Pseudoephedrine/Phenylephrine):  Consider OTC decongestants for nasal congestion.  Use with caution if you have hypertension, and avoid prolonged use.    Cough Suppressants (Dextromethorphan):  Choose a cough suppressant for persistent cough.  Avoid in children under 12 years; use honey instead.  Follow package instructions and avoid multiple medications with similar ingredients.    Pain Relievers (Acetaminophen or Ibuprofen):  Use acetaminophen or ibuprofen for pain and fever.  Follow package instructions.  Take ibuprofen with food to minimize stomach upset.    Rest and Isolation:  Prioritize rest and stay at home to prevent spreading the infection.    For All Ages:    Hydration:  Ensure you stay well-hydrated; monitor urine color to gauge hydration.    Hand Hygiene:  Wash hands with soap and water for at least 20 seconds.  Use hand  with at least 60% alcohol if soap is unavailable.    Avoid Tobacco Smoke:  Stay away from tobacco smoke, which can worsen respiratory symptoms.    Seek Medical Attention:  If symptoms worsen or if you experience difficulty breathing, seek medical attention promptly.  Look out for red flag symptoms like persistent high fever, severe headache, or chest pain.    Patient advised to return to clinic for reevaluation (either UC or PCP) if symptoms do not improve in 7 days. Patient educated on red flag symptoms and asked to go directly to the ED if these symptoms present themselves.     Remember, this guide is meant to assist you, but individual cases may vary. If you have concerns or if symptoms persist, don't hesitate to reach out to a healthcare professional. Wishing you a speedy recovery!      Oskar Gaming PA-C  [facility]    Subjective   28 year old who presents to clinic today for the following  health issues:    Urgent Care       HPI     Acute Illness  Acute illness concerns: Pt presents with throat pain, fever headache X 3 days.   Symptoms:  Fever: YES  Chills/Sweats: YES  Headache (location?): YES  Sinus Pressure: No  Conjunctivitis:  No  Ear Pain: no  Rhinorrhea: No  Congestion: No  Sore Throat: YES  Cough: no  Wheeze: No  Decreased Appetite: YES  Nausea: YES  Vomiting: No  Diarrhea: No  Dysuria/Freq.: No  Dysuria or Hematuria: No  Fatigue/Achiness: YES  Sick/Strep Exposure: Daughter's friend recently had strep. Exposure was recent   Therapies tried and outcome: Tylenol     Review of Systems   Review of Systems   See HPI    Objective    Temp: 98.8  F (37.1  C) Temp src: Tympanic BP: 110/75 Pulse: 89   Resp: 14 SpO2: 99 %       Physical Exam   Physical Exam  Constitutional:       General: She is not in acute distress.     Appearance: Normal appearance. She is normal weight. She is not ill-appearing, toxic-appearing or diaphoretic.   HENT:      Head: Normocephalic and atraumatic.      Right Ear: Tympanic membrane, ear canal and external ear normal. There is no impacted cerumen.      Left Ear: Tympanic membrane, ear canal and external ear normal. There is no impacted cerumen.      Nose: Nose normal. No congestion or rhinorrhea.      Mouth/Throat:      Mouth: Mucous membranes are moist.      Pharynx: Posterior oropharyngeal erythema present. No oropharyngeal exudate.   Cardiovascular:      Rate and Rhythm: Normal rate and regular rhythm.      Pulses: Normal pulses.      Heart sounds: Normal heart sounds. No murmur heard.     No friction rub. No gallop.   Pulmonary:      Effort: Pulmonary effort is normal. No respiratory distress.      Breath sounds: No stridor. No wheezing, rhonchi or rales.   Chest:      Chest wall: No tenderness.   Lymphadenopathy:      Cervical: Cervical adenopathy present.   Neurological:      General: No focal deficit present.      Mental Status: She is alert and oriented to person,  place, and time. Mental status is at baseline.      Gait: Gait normal.   Psychiatric:         Mood and Affect: Mood normal.         Behavior: Behavior normal.         Thought Content: Thought content normal.         Judgment: Judgment normal.          Results for orders placed or performed in visit on 04/11/24 (from the past 24 hour(s))   Streptococcus A Rapid Screen w/Reflex to PCR - Clinic Collect    Specimen: Throat; Swab   Result Value Ref Range    Group A Strep antigen Positive (A) Negative

## 2024-07-07 ENCOUNTER — HEALTH MAINTENANCE LETTER (OUTPATIENT)
Age: 29
End: 2024-07-07

## 2024-08-25 NOTE — TELEPHONE ENCOUNTER
29w3d, SAURABH 6/5/18. Pt has pain in her right foot in the arch area as well as pain in the back of her calf. Pt states calf is tender to touch. Pt not sure if has red spots. States feels like pain from arch to back of calf. Pt advised to go to Ed to have checked out. Pt informed could be blood clot. Pt will go.   
Pt is having a lot of intense pain in the arch of her foot and swelling.  Would like to speak to a nurse about it.  
No

## 2024-09-14 ENCOUNTER — OFFICE VISIT (OUTPATIENT)
Dept: URGENT CARE | Facility: URGENT CARE | Age: 29
End: 2024-09-14
Payer: COMMERCIAL

## 2024-09-14 VITALS
BODY MASS INDEX: 27.66 KG/M2 | SYSTOLIC BLOOD PRESSURE: 119 MMHG | OXYGEN SATURATION: 99 % | HEART RATE: 62 BPM | RESPIRATION RATE: 18 BRPM | TEMPERATURE: 98.7 F | WEIGHT: 131.2 LBS | DIASTOLIC BLOOD PRESSURE: 74 MMHG

## 2024-09-14 DIAGNOSIS — R07.0 THROAT PAIN: Primary | ICD-10-CM

## 2024-09-14 DIAGNOSIS — J02.0 STREPTOCOCCAL PHARYNGITIS: ICD-10-CM

## 2024-09-14 LAB
DEPRECATED S PYO AG THROAT QL EIA: NEGATIVE
GROUP A STREP BY PCR: DETECTED

## 2024-09-14 PROCEDURE — 99213 OFFICE O/P EST LOW 20 MIN: CPT | Performed by: NURSE PRACTITIONER

## 2024-09-14 PROCEDURE — 87651 STREP A DNA AMP PROBE: CPT | Performed by: NURSE PRACTITIONER

## 2024-09-14 RX ORDER — PENICILLIN V POTASSIUM 500 MG/1
500 TABLET, FILM COATED ORAL 2 TIMES DAILY
Qty: 20 TABLET | Refills: 0 | Status: SHIPPED | OUTPATIENT
Start: 2024-09-14 | End: 2024-09-24

## 2024-09-14 NOTE — ADDENDUM NOTE
Addended by: DAJA BURNETTE on: 9/14/2024 06:22 PM     Modules accepted: Orders     Addended by: GIUSEPPE GRACIA on: 3/2/2023 08:03 AM     Modules accepted: Orders

## 2024-09-14 NOTE — PROGRESS NOTES
Assessment & Plan     Throat pain  - Streptococcus A Rapid Screen w/Reflex to PCR - Clinic Collect         Return in about 1 week (around 9/21/2024) for with regular provider if symptoms persist.    TARA Skaggs CNP [facility] MELANI Treadwell is a 29 year old female who presents to clinic today for the following health issues:  Chief Complaint   Patient presents with    Pharyngitis     Strep exposure. Son tested positive for strep yesterday. Sore throat        HPI      URI Adult    Onset of symptoms was 1 day(s) ago.  Course of illness is same.    Severity moderate  Current and Associated symptoms: ST  Treatment measures tried include Tylenol/Ibuprofen, Fluids, and Rest.  Predisposing factors include exposure to strep.      Review of Systems  Constitutional, HEENT, cardiovascular, pulmonary, GI, , musculoskeletal, neuro, skin, endocrine and psych systems are negative, except as otherwise noted.      Objective    /74 (BP Location: Left arm, Patient Position: Sitting, Cuff Size: Adult Regular)   Pulse 62   Temp 98.7  F (37.1  C) (Oral)   Resp 18   Wt 59.5 kg (131 lb 3.2 oz)   SpO2 99%   BMI 27.66 kg/m    Physical Exam   GENERAL: alert and no distress  EYES: Eyes grossly normal to inspection, PERRL and conjunctivae and sclerae normal  HENT: normal cephalic/atraumatic, ear canals and TM's normal, nose and mouth without ulcers or lesions, oral mucous membranes moist, tonsillar hypertrophy, and tonsillar erythema  NECK: no adenopathy, no asymmetry, masses, or scars  RESP: lungs clear to auscultation - no rales, rhonchi or wheezes  CV: regular rate and rhythm, normal S1 S2, no S3 or S4, no murmur, click or rub, no peripheral edema  ABDOMEN: soft, nontender, no hepatosplenomegaly, no masses and bowel sounds normal  MS: no gross musculoskeletal defects noted, no edema

## 2024-09-14 NOTE — PATIENT INSTRUCTIONS
Results for orders placed or performed in visit on 09/14/24   Streptococcus A Rapid Screen w/Reflex to PCR - Clinic Collect     Status: Normal    Specimen: Throat; Swab   Result Value Ref Range    Group A Strep antigen Negative Negative       RST negative  TC  swab pending.    Push fluids  Lots of handwashing.   Ibuprofen as needed for fever or pain  Delsymor dayquil/nyquil for cough as needed     Rest as able.   Will call if any other labs positive.    F/u in the clinic if symptoms persist or worsen.

## 2024-09-20 ENCOUNTER — OFFICE VISIT (OUTPATIENT)
Dept: MIDWIFE SERVICES | Facility: CLINIC | Age: 29
End: 2024-09-20
Payer: COMMERCIAL

## 2024-09-20 VITALS — BODY MASS INDEX: 27.41 KG/M2 | DIASTOLIC BLOOD PRESSURE: 78 MMHG | WEIGHT: 130 LBS | SYSTOLIC BLOOD PRESSURE: 118 MMHG

## 2024-09-20 DIAGNOSIS — Z11.8 SCREENING FOR CHLAMYDIAL DISEASE: ICD-10-CM

## 2024-09-20 DIAGNOSIS — Z11.3 SCREEN FOR STD (SEXUALLY TRANSMITTED DISEASE): ICD-10-CM

## 2024-09-20 DIAGNOSIS — Z12.4 SCREENING FOR CERVICAL CANCER: ICD-10-CM

## 2024-09-20 DIAGNOSIS — Z30.433 ENCOUNTER FOR REMOVAL AND REINSERTION OF INTRAUTERINE CONTRACEPTIVE DEVICE: Primary | ICD-10-CM

## 2024-09-20 PROCEDURE — 58301 REMOVE INTRAUTERINE DEVICE: CPT | Performed by: NURSE PRACTITIONER

## 2024-09-20 PROCEDURE — 58300 INSERT INTRAUTERINE DEVICE: CPT | Performed by: NURSE PRACTITIONER

## 2024-09-20 PROCEDURE — 87491 CHLMYD TRACH DNA AMP PROBE: CPT | Performed by: NURSE PRACTITIONER

## 2024-09-20 PROCEDURE — 87624 HPV HI-RISK TYP POOLED RSLT: CPT | Performed by: NURSE PRACTITIONER

## 2024-09-20 PROCEDURE — G0145 SCR C/V CYTO,THINLAYER,RESCR: HCPCS | Performed by: NURSE PRACTITIONER

## 2024-09-20 PROCEDURE — 87591 N.GONORRHOEAE DNA AMP PROB: CPT | Performed by: NURSE PRACTITIONER

## 2024-09-20 NOTE — PROGRESS NOTES
SUBJECTIVE:                                                   Mile Cobso is a 29 year old who presents to clinic today for the following health issue(s):  Patient presents with:  Consult: Pap and discuss IUD removal      HPI:  Mile had Mirena IUD placed 2018.  She reports no issues with Mirena, is concerned it is 6 yrs old.  Is also due for pap.     No LMP recorded. (Menstrual status: IUD).  Menstrual History: Absent with IUD   Patient is sexually active  .  Using IUD for contraception.   Health maintenance updated:  yes  STI infx testing offered:  Declined      Problem list and histories reviewed & adjusted, as indicated.  Additional history: as documented.    Patient Active Problem List   Diagnosis    Previous  delivery, antepartum    Indication for care in labor or delivery    Status post  delivery     Past Surgical History:   Procedure Laterality Date     SECTION N/A 2015    Procedure:  SECTION;  Surgeon: Malena Arnold MD;  Location:  [facility]     SECTION N/A 2018    Procedure:  SECTION;  REPEAT  SECTION;  Surgeon: Loreta Rodriguez MD;  Location:  [facility]      Social History     Tobacco Use    Smoking status: Former     Types: Cigarettes    Smokeless tobacco: Never    Tobacco comments:     Stopped smoking when found out is pregnanat with first child.   Substance Use Topics    Alcohol use: No      *Patient is Adopted           Problem (# of Occurrences) Relation (Name,Age of Onset)    HIV/AIDS (1) Mother: biological mom. contracted it while in longterm when Mile was a young child.  of AIDS. doesn't know her biological father.    No Known Problems (8) Father, Sister, Brother, Maternal Grandmother, Maternal Grandfather, Paternal Grandmother, Paternal Grandfather, Other              Current Outpatient Medications   Medication Sig Dispense Refill    levonorgestrel (MIRENA) 20 MCG/24HR IUD 1 each (20 mcg) by Intrauterine route  continuous Lot [id number], Exp 02/2021  S/N [id number]      levonorgestrel (MIRENA) 52 MG (20 mcg/day) IUD 1 each by Intrauterine route once.      penicillin V (VEETID) 500 MG tablet Take 1 tablet (500 mg) by mouth 2 times daily for 10 days. 20 tablet 0     No current facility-administered medications for this visit.     No Known Allergies    ROS:  12 point review of systems negative other than symptoms noted below.    OBJECTIVE:     /78   Wt 59 kg (130 lb)   BMI 27.41 kg/m    Body mass index is 27.41 kg/m .    PHYSICAL EXAM:  Constitutional:  Appearance: Well nourished, well developed alert, in no acute distress  Neurologic:  Mental Status:  Oriented X3.  Normal strength and tone, sensory exam grossly normal, mentation intact and speech normal.    Psychiatric:  Mentation appears normal and affect normal/bright.  Pelvic Exam:  External Genitalia:     Normal appearance for age, no discharge present, no tenderness present, no inflammatory lesions present, color normal  Vagina:    Normal vaginal vault without central or paravaginal defects, no discharge present, no inflammatory lesions present, no masses present  Bladder:     Nontender to palpation  Urethra:   Urethral Body:  Urethra palpation normal, urethra structural support normal   Urethral Meatus:  No erythema or lesions present  Cervix:     Appearance healthy, no lesions present, nontender to palpation, no bleeding present, string present  Uterus:     Nontender to palpation, no masses present, position anteflexed, mobility: normal  Adnexa:     No adnexal tenderness present, no adnexal masses present  Perineum:     Perineum within normal limits, no evidence of trauma, no rashes or skin lesions present  Anus:     Anus within normal limits, no hemorrhoids present  Inguinal Lymph Nodes:     No lymphadenopathy present  Pubic Hair:     Normal pubic hair distribution for age  Genitalia and Groin:     No rashes present, no lesions present, no areas of  discoloration, no masses present     In-Clinic Test Results:  No results found for this or any previous visit (from the past 24 hour(s)).    ASSESSMENT/PLAN:                                                        ICD-10-CM    1. Screening for cervical cancer  Z12.4 HPV and Gynecologic Cytology Panel - Recommended Age 30 - 65 Years      2. Encounter for removal and reinsertion of intrauterine contraceptive device  Z30.433 levonorgestrel (MIRENA) 52 MG (20 mcg/day) IUD 1 each     levonorgestrel (MIRENA) 52 MG (20 mcg/day) IUD     INSERTION INTRAUTERINE DEVICE     REMOVE INTRAUTERINE DEVICE      3. Screening for chlamydial disease  Z11.8 CHLAMYDIA TRACHOMATIS PCR      4. Screen for STD (sexually transmitted disease)  Z11.3 NEISSERIA GONORRHOEA PCR          COUNSELING:  Discussed options to leave IUD in for additional 1-2 more years or have removed/replaced today.  Discussed R/B/A.  Would like removal/reinsertion today.       SUBJECTIVE:    Is a pregnancy test required: No.  Was a consent obtained?  Yes    Subjective: Mile Cobos is a 29 year old  presents for IUD and desires Mirena type IUD.  She requests removal of the IUD because  it has been in place for > 5 yrs, desires replacement    Patient has been given the opportunity to ask questions about all forms of birth control, including all options appropriate for Mile Cobos. Discussed that no method of birth control, except abstinence is 100% effective against pregnancy or sexually transmitted infection.     Mile Cobos understands she may have the IUD removed at any time. IUD should be removed by a health care provider and the current IUD will be removed today.    The entire removal and insertion procedure was reviewed with the patient, including care after placement.    Today's PHQ-2 Score:       2018    11:42 AM   PHQ-2 (  [employer])   Q1: Little interest or pleasure in doing things 0   Q2: Feeling down, depressed or hopeless 0    PHQ-2 Score 0       PROCEDURE:    A speculum exam was performed and the cervix was visualized. The IUD string was visualized. Using ring forceps, the string  was grasped and the IUD removed intact.    Under sterile technique, cervix was visualized with speculum and prepped with Betadine solution swab x 3. Tenaculum was placed for stability. The uterus was gently straightened and sounded to 7.0 cm. IUD prepared for placement, and IUD inserted according to 's instructions without difficulty or significant ressitance, and deployed at the fundus. The strings were visualized and trimmed to 3.5 cm from the external os. Tenaculum was removed and hemostasis noted. Speculum removed.  Patient tolerated procedure well.    EBL: minimal    Complications: none          POST PROCEDURE:    Given 's handouts, including when to have IUD removed, list of danger s/sx, side effects and follow up recommended. Encouraged condom use for prevention of STD. Advised to call for any fever, for prolonged or severe pain or bleeding, abnormal vaginal dischage, or unable to palpate strings. She was advised to use pain medications (ibuprofen) as needed for mild to moderate pain. Advised to follow-up in clinic in 4-6 weeks for IUD string check if unable to find strings or as directed by provider.   STI screening done per request, will treat if clinically indicated  Pap per ASCCP guidelines  Discussed Mirena IUD is approved for use up to 8 yrs for contraception, 5 years for AUB    TARA Gatica CNM

## 2024-09-20 NOTE — PATIENT INSTRUCTIONS
Your Intrauterine Device (IUD)     What to watch for right after IUD placement:    Some women may experience uterine cramps, bleeding, and/or dizziness during and right after IUD placement.     To help minimize the cramps, you may take ibuprofen 600 mg with food. These symptoms should improve over the next 24 hours.  Mild cramping may be present for a few days after IUD placement. You may continue taking ibuprofen as directed if needed.    You may experience spotting or bleeding for the first few weeks to months after IUD placement.      Other side effects can include:  Anemia, hemorrhage, partial or complete expulsion of device, uterine or cervical perforation, embedding of IUD in the uterine wall, increased risk of pelvic inflammatory disease.  Women who become pregnant with an IUD in place are at higher risk of an ectopic pregnancy.  There is also a higher risk of miscarriage when pregnancy occurs with an IUD in place.    Use condoms or abstain from sex for 7 days after the insertion of your Mirena or Kyleena or Roula  IUD.  This is not necessary if you had a ParaGard IUD placed.    If you experience fever, chills, abdominal pain, worsening pelvic pain, dizziness, unusually heavy vaginal bleeding, suspected expulsion of device or foul smelling vaginal discharge please call the clinic for evaluation.    Please schedule an appointment at the clinic for a string check 4-6 weeks after IUD placement    Your periods may change (Roula/Kyleena/Mirena):    For the first 3 to 6 months, your monthly period may become irregular. You may also have frequent spotting or light bleeding. A few women have heavy bleeding during this time. After your body adjusts, the number of bleeding days is likely to decrease (but may remain irregular), and you may even find that your periods stop altogether for as long as Roula/Mirena is in place.  Your periods will return rapidly once the IUD is removed.      ParaGard IUD users:    ParaGard  IUD users may experience heavier than normal cycles while their IUD is in place, this is considered normal   Back-up contraception is not needed      Checking for your strings:    We encourage everyone with an IUD in place to check for their strings monthly    You may check your own IUD strings by inserting a finger into the vagina and feeling the strings as they exit the cervix.  The strings will initially feel firm, like fishing line, but will soften over a few weeks.  After the strings have softened, you or your partner should not be able to feel the strings during intercourse.     If the string length greatly changes or if you cannot feel your strings at all please make an appointment to see you midwife and use a backup method of contraception like a condom.    If you can feel something hard/plastic like the IUD may not be in the correct place. You should then see your healthcare provider to have the position confirmed with ultrasound.       Remember:    IUD's do not protect against HIV or STIs.  IUD's do not prevent the formation of ovarian cysts.  IUD's do not typically reduce acne or cause weight gain or mood changes.    For more information:  [email or web address]-us.com/    The ParaGard IUD is effective for 12 years.  The Roula IUD is effective for 3 years.  The Kyleena IUD is effective for 5 years.  The Mirena IUD is effective for 8 years.  Your IUD can easily be removed by a healthcare professional at any time.    Do not try to remove the IUD yourself.      If you have questions or concerns please call:    [facility]   [phone]

## 2024-09-21 LAB
C TRACH DNA SPEC QL NAA+PROBE: NEGATIVE
N GONORRHOEA DNA SPEC QL NAA+PROBE: NEGATIVE

## 2024-09-23 LAB
HPV HR 12 DNA CVX QL NAA+PROBE: NEGATIVE
HPV16 DNA CVX QL NAA+PROBE: NEGATIVE
HPV18 DNA CVX QL NAA+PROBE: NEGATIVE
HUMAN PAPILLOMA VIRUS FINAL DIAGNOSIS: NORMAL

## 2024-09-25 LAB
BKR AP ASSOCIATED HPV REPORT: NORMAL
BKR LAB AP GYN ADEQUACY: NORMAL
BKR LAB AP GYN INTERPRETATION: NORMAL
BKR LAB AP PREVIOUS ABNORMAL: NORMAL
PATH REPORT.COMMENTS IMP SPEC: NORMAL
PATH REPORT.COMMENTS IMP SPEC: NORMAL
PATH REPORT.RELEVANT HX SPEC: NORMAL

## 2024-11-12 ENCOUNTER — OFFICE VISIT (OUTPATIENT)
Dept: URGENT CARE | Facility: URGENT CARE | Age: 29
End: 2024-11-12
Payer: COMMERCIAL

## 2024-11-12 VITALS
RESPIRATION RATE: 14 BRPM | DIASTOLIC BLOOD PRESSURE: 67 MMHG | BODY MASS INDEX: 27.83 KG/M2 | OXYGEN SATURATION: 98 % | TEMPERATURE: 98.9 F | HEART RATE: 87 BPM | WEIGHT: 132 LBS | SYSTOLIC BLOOD PRESSURE: 105 MMHG

## 2024-11-12 DIAGNOSIS — R07.0 THROAT PAIN: Primary | ICD-10-CM

## 2024-11-12 DIAGNOSIS — R05.1 ACUTE COUGH: ICD-10-CM

## 2024-11-12 LAB
DEPRECATED S PYO AG THROAT QL EIA: NEGATIVE
FLUAV AG SPEC QL IA: NEGATIVE
FLUBV AG SPEC QL IA: NEGATIVE
GROUP A STREP BY PCR: NOT DETECTED

## 2024-11-12 PROCEDURE — 87651 STREP A DNA AMP PROBE: CPT | Performed by: PHYSICIAN ASSISTANT

## 2024-11-12 PROCEDURE — 87804 INFLUENZA ASSAY W/OPTIC: CPT | Performed by: PHYSICIAN ASSISTANT

## 2024-11-12 PROCEDURE — 99213 OFFICE O/P EST LOW 20 MIN: CPT | Performed by: PHYSICIAN ASSISTANT

## 2024-11-12 PROCEDURE — 87635 SARS-COV-2 COVID-19 AMP PRB: CPT | Performed by: PHYSICIAN ASSISTANT

## 2024-11-12 NOTE — PATIENT INSTRUCTIONS
(R07.0) Throat pain  (primary encounter diagnosis)  Comment:   Plan: Streptococcus A Rapid Screen w/Reflex to PCR -         Clinic Collect, Influenza A & B Antigen -         Clinic Collect, Symptomatic COVID-19 Virus         (Coronavirus) by PCR Nasopharyngeal, Group A         Streptococcus PCR Throat Swab            (R05.1) Acute cough  Comment:   Plan: Streptococcus A Rapid Screen w/Reflex to PCR -         Clinic Collect, Influenza A & B Antigen -         Clinic Collect, Symptomatic COVID-19 Virus         (Coronavirus) by PCR Nasopharyngeal, Group A         Streptococcus PCR Throat Swab            Rest.      Tylenol or ibuprofen as needed.    If covid is positive, please follow the CDC's guidance on isolation.

## 2024-11-12 NOTE — PROGRESS NOTES
Patient presents with:  Urgent Care: Yesterday started with Sore throat and coughing, dull headache.  Coughed up phlegm last night. Chills. Mild nausea, denies vomiting.    (R07.0) Throat pain  (primary encounter diagnosis)  Comment:   Plan: Streptococcus A Rapid Screen w/Reflex to PCR -         Clinic Collect, Influenza A & B Antigen -         Clinic Collect, Symptomatic COVID-19 Virus         (Coronavirus) by PCR Nasopharyngeal, Group A         Streptococcus PCR Throat Swab            (R05.1) Acute cough  Comment:   Plan: Streptococcus A Rapid Screen w/Reflex to PCR -         Clinic Collect, Influenza A & B Antigen -         Clinic Collect, Symptomatic COVID-19 Virus         (Coronavirus) by PCR Nasopharyngeal, Group A         Streptococcus PCR Throat Swab            Rest.      Tylenol or ibuprofen as needed.    If covid is positive, please follow the CDC's guidance on isolation.            At the end of the encounter, I discussed results, diagnosis, medications. Discussed red flags for immediate return to clinic/ER, as well as indications for follow up if no improvement. Patient understood and agreed to plan. Patient was stable for discharge     If not improving or if condition worsens, follow up with your Primary Care Provider          SUBJECTIVE:   Mile Cobos is a 29 year old female who presents today with throat pain coughing and a dull headache.  Cough is productive last night.  Mild nausea.  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      Patient Active Problem List   Diagnosis    Status post  delivery    Mirena IUD in place         Past Medical History:   Diagnosis Date    Abnormal Pap smear of cervix 2015    AS-CUS, HPV Negative    Anxiety     Has never been on medications for it, stated it started postpartum.    History of chlamydia     In high school, was treated and had a treatment of cure.    History of gonorrhea     In high school, was treated and had  a treatment of cure.    IUD contraception 09/20/2024    Mirena replaced by Keena Bocanegra. Mirena replaced 2/24/17 (2015) by Dr. Arnold.    Mirena IUD in place 09/20/2024    Mirena replaced 9/20/2024              Current Outpatient Medications   Medication Sig Dispense Refill    Multiple Vitamins-Iron (DAILY-MANFRED/IRON/BETA-CAROTENE) TABS TAKE 1 TABLET BY MOUTH DAILY. (Patient not taking: Reported on 10/19/2020) 30 tablet 7     Social History     Tobacco Use    Smoking status: Never Smoker    Smokeless tobacco: Never Used   Substance Use Topics    Alcohol use: Not on file     Family History   Problem Relation Age of Onset    Diabetes Mother     Diabetes Father          ROS:    10 point ROS of systems including Constitutional, Eyes, Respiratory, Cardiovascular, Gastroenterology, Genitourinary, Integumentary, Muscularskeletal, Psychiatric ,neurological were all negative except for pertinent positives noted in my HPI       OBJECTIVE:  /67   Pulse 87   Temp 98.9  F (37.2  C) (Tympanic)   Resp 14   Wt 59.9 kg (132 lb)   SpO2 98%   BMI 27.83 kg/m    Physical Exam:  GENERAL APPEARANCE: healthy, alert and no distress  HENT: ear canals and TM's normal.  Nose with boggy turbinates and whitish clear coryza and mouth without ulcers, erythema or lesions  NECK: supple, nontender, no lymphadenopathy  RESP: lungs clear to auscultation - no rales, rhonchi or wheezes  CV: regular rates and rhythm, normal S1 S2, no murmur noted  SKIN: no suspicious lesions or rashes    Results for orders placed or performed in visit on 11/12/24   Streptococcus A Rapid Screen w/Reflex to PCR - Clinic Collect     Status: Normal    Specimen: Throat; Swab   Result Value Ref Range    Group A Strep antigen Negative Negative   Influenza A & B Antigen - Clinic Collect     Status: Normal    Specimen: Nose; Swab   Result Value Ref Range    Influenza A antigen Negative Negative    Influenza B antigen Negative Negative    Narrative    Test results  must be correlated with clinical data. If necessary, results should be confirmed by a molecular assay or viral culture.

## 2024-11-13 LAB — SARS-COV-2 RNA RESP QL NAA+PROBE: NEGATIVE

## (undated) DEVICE — PREP CHLORAPREP 26ML TINTED ORANGE  260815

## (undated) DEVICE — LINEN C-SECTION 5415

## (undated) DEVICE — SU PDS II 0 CTX 60" Z990G

## (undated) DEVICE — SU VICRYL 0 CT 36" J358H

## (undated) DEVICE — GLOVE PROTEXIS POWDER FREE 7.0 ORTHOPEDIC 2D73ET70

## (undated) DEVICE — SOL NACL 0.9% IRRIG 1000ML BOTTLE 07138-09

## (undated) DEVICE — CATH TRAY FOLEY 16FR BARDEX W/DRAIN BAG STATLOCK 300316A

## (undated) DEVICE — SUCTION CANISTER MEDIVAC LINER 3000ML W/LID 65651-530

## (undated) DEVICE — SU MONOCRYL 4-0 PS-2 18" UND Y496G

## (undated) DEVICE — SU VICRYL 3-0 SH 27" J316H

## (undated) DEVICE — BLADE CLIPPER 4406

## (undated) DEVICE — PACK C-SECTION LF PL15OTA83B

## (undated) DEVICE — GLOVE PROTEXIS W/NEU-THERA 6.0  2D73TE60

## (undated) DEVICE — DRSG STERI STRIP 1/2X4" R1547

## (undated) DEVICE — ESU GROUND PAD UNIVERSAL W/O CORD